# Patient Record
Sex: FEMALE | Race: WHITE | NOT HISPANIC OR LATINO | Employment: UNEMPLOYED | URBAN - METROPOLITAN AREA
[De-identification: names, ages, dates, MRNs, and addresses within clinical notes are randomized per-mention and may not be internally consistent; named-entity substitution may affect disease eponyms.]

---

## 2020-08-02 ENCOUNTER — ANESTHESIA (OUTPATIENT)
Dept: LABOR AND DELIVERY | Facility: HOSPITAL | Age: 40
End: 2020-08-02

## 2020-08-02 ENCOUNTER — ANESTHESIA EVENT (OUTPATIENT)
Dept: LABOR AND DELIVERY | Facility: HOSPITAL | Age: 40
End: 2020-08-02

## 2020-08-02 ENCOUNTER — HOSPITAL ENCOUNTER (INPATIENT)
Facility: HOSPITAL | Age: 40
LOS: 5 days | Discharge: HOME | End: 2020-08-07
Attending: OBSTETRICS & GYNECOLOGY | Admitting: OBSTETRICS & GYNECOLOGY

## 2020-08-02 DIAGNOSIS — Z3A.36 36 WEEKS GESTATION OF PREGNANCY: Primary | ICD-10-CM

## 2020-08-02 LAB
ABO GROUP BLD: NORMAL
ALP SERPL-CCNC: 127 U/L (ref 39–117)
ALT SERPL W P-5'-P-CCNC: 14 U/L (ref 1–33)
AMPHET+METHAMPHET UR QL: NEGATIVE
AMPHETAMINES UR QL: NEGATIVE
AST SERPL-CCNC: 26 U/L (ref 1–32)
BARBITURATES UR QL SCN: NEGATIVE
BENZODIAZ UR QL SCN: NEGATIVE
BILIRUB SERPL-MCNC: 0.2 MG/DL (ref 0–1.2)
BLD GP AB SCN SERPL QL: NEGATIVE
BUPRENORPHINE SERPL-MCNC: NEGATIVE NG/ML
CANNABINOIDS SERPL QL: POSITIVE
COCAINE UR QL: NEGATIVE
CREAT SERPL-MCNC: 0.66 MG/DL (ref 0.57–1)
DEPRECATED RDW RBC AUTO: 44.8 FL (ref 37–54)
ERYTHROCYTE [DISTWIDTH] IN BLOOD BY AUTOMATED COUNT: 13.6 % (ref 12.3–15.4)
HCT VFR BLD AUTO: 34.1 % (ref 34–46.6)
HGB BLD-MCNC: 11.3 G/DL (ref 12–15.9)
LDH SERPL-CCNC: 199 U/L (ref 135–214)
MCH RBC QN AUTO: 29.8 PG (ref 26.6–33)
MCHC RBC AUTO-ENTMCNC: 33.1 G/DL (ref 31.5–35.7)
MCV RBC AUTO: 90 FL (ref 79–97)
METHADONE UR QL SCN: NEGATIVE
OPIATES UR QL: NEGATIVE
OXYCODONE UR QL SCN: POSITIVE
PCP UR QL SCN: NEGATIVE
PLATELET # BLD AUTO: 244 10*3/MM3 (ref 140–450)
PMV BLD AUTO: 10.7 FL (ref 6–12)
PROPOXYPH UR QL: NEGATIVE
RBC # BLD AUTO: 3.79 10*6/MM3 (ref 3.77–5.28)
RH BLD: POSITIVE
T&S EXPIRATION DATE: NORMAL
TRICYCLICS UR QL SCN: NEGATIVE
URATE SERPL-MCNC: 3.3 MG/DL (ref 2.4–5.7)
WBC # BLD AUTO: 9.28 10*3/MM3 (ref 3.4–10.8)

## 2020-08-02 PROCEDURE — 25010000002 ONDANSETRON PER 1 MG: Performed by: ANESTHESIOLOGY

## 2020-08-02 PROCEDURE — 86901 BLOOD TYPING SEROLOGIC RH(D): CPT | Performed by: OBSTETRICS & GYNECOLOGY

## 2020-08-02 PROCEDURE — 25010000002 FENTANYL CITRATE (PF) 100 MCG/2ML SOLUTION: Performed by: ANESTHESIOLOGY

## 2020-08-02 PROCEDURE — 25010000002 KETOROLAC TROMETHAMINE PER 15 MG: Performed by: OBSTETRICS & GYNECOLOGY

## 2020-08-02 PROCEDURE — 82565 ASSAY OF CREATININE: CPT | Performed by: OBSTETRICS & GYNECOLOGY

## 2020-08-02 PROCEDURE — 84550 ASSAY OF BLOOD/URIC ACID: CPT | Performed by: OBSTETRICS & GYNECOLOGY

## 2020-08-02 PROCEDURE — 80306 DRUG TEST PRSMV INSTRMNT: CPT | Performed by: OBSTETRICS & GYNECOLOGY

## 2020-08-02 PROCEDURE — G0480 DRUG TEST DEF 1-7 CLASSES: HCPCS | Performed by: OBSTETRICS & GYNECOLOGY

## 2020-08-02 PROCEDURE — 84075 ASSAY ALKALINE PHOSPHATASE: CPT | Performed by: OBSTETRICS & GYNECOLOGY

## 2020-08-02 PROCEDURE — 84460 ALANINE AMINO (ALT) (SGPT): CPT | Performed by: OBSTETRICS & GYNECOLOGY

## 2020-08-02 PROCEDURE — 25010000002 METOCLOPRAMIDE PER 10 MG: Performed by: ANESTHESIOLOGY

## 2020-08-02 PROCEDURE — 0UT70ZZ RESECTION OF BILATERAL FALLOPIAN TUBES, OPEN APPROACH: ICD-10-PCS | Performed by: OBSTETRICS & GYNECOLOGY

## 2020-08-02 PROCEDURE — 85027 COMPLETE CBC AUTOMATED: CPT | Performed by: OBSTETRICS & GYNECOLOGY

## 2020-08-02 PROCEDURE — 25010000003 MORPHINE PER 10 MG: Performed by: ANESTHESIOLOGY

## 2020-08-02 PROCEDURE — 86850 RBC ANTIBODY SCREEN: CPT | Performed by: OBSTETRICS & GYNECOLOGY

## 2020-08-02 PROCEDURE — 84450 TRANSFERASE (AST) (SGOT): CPT | Performed by: OBSTETRICS & GYNECOLOGY

## 2020-08-02 PROCEDURE — 88302 TISSUE EXAM BY PATHOLOGIST: CPT | Performed by: OBSTETRICS & GYNECOLOGY

## 2020-08-02 PROCEDURE — 25010000002 MIDAZOLAM PER 1 MG: Performed by: ANESTHESIOLOGY

## 2020-08-02 PROCEDURE — 86900 BLOOD TYPING SEROLOGIC ABO: CPT | Performed by: OBSTETRICS & GYNECOLOGY

## 2020-08-02 PROCEDURE — 82247 BILIRUBIN TOTAL: CPT | Performed by: OBSTETRICS & GYNECOLOGY

## 2020-08-02 PROCEDURE — 83615 LACTATE (LD) (LDH) ENZYME: CPT | Performed by: OBSTETRICS & GYNECOLOGY

## 2020-08-02 PROCEDURE — 59025 FETAL NON-STRESS TEST: CPT

## 2020-08-02 RX ORDER — KETOROLAC TROMETHAMINE 30 MG/ML
30 INJECTION, SOLUTION INTRAMUSCULAR; INTRAVENOUS EVERY 6 HOURS PRN
Status: DISPENSED | OUTPATIENT
Start: 2020-08-02 | End: 2020-08-07

## 2020-08-02 RX ORDER — TRISODIUM CITRATE DIHYDRATE AND CITRIC ACID MONOHYDRATE 500; 334 MG/5ML; MG/5ML
30 SOLUTION ORAL ONCE
Status: COMPLETED | OUTPATIENT
Start: 2020-08-02 | End: 2020-08-02

## 2020-08-02 RX ORDER — OXYTOCIN-SODIUM CHLORIDE 0.9% IV SOLN 30 UNIT/500ML 30-0.9/5 UT/ML-%
85 SOLUTION INTRAVENOUS ONCE
Status: DISCONTINUED | OUTPATIENT
Start: 2020-08-02 | End: 2020-08-02 | Stop reason: HOSPADM

## 2020-08-02 RX ORDER — OXYTOCIN-SODIUM CHLORIDE 0.9% IV SOLN 30 UNIT/500ML 30-0.9/5 UT/ML-%
SOLUTION INTRAVENOUS AS NEEDED
Status: DISCONTINUED | OUTPATIENT
Start: 2020-08-02 | End: 2020-08-02 | Stop reason: SURG

## 2020-08-02 RX ORDER — CARBOPROST TROMETHAMINE 250 UG/ML
250 INJECTION, SOLUTION INTRAMUSCULAR AS NEEDED
Status: DISCONTINUED | OUTPATIENT
Start: 2020-08-02 | End: 2020-08-02 | Stop reason: HOSPADM

## 2020-08-02 RX ORDER — NALOXONE HCL 0.4 MG/ML
0.4 VIAL (ML) INJECTION
Status: ACTIVE | OUTPATIENT
Start: 2020-08-02 | End: 2020-08-03

## 2020-08-02 RX ORDER — ONDANSETRON 2 MG/ML
INJECTION INTRAMUSCULAR; INTRAVENOUS AS NEEDED
Status: DISCONTINUED | OUTPATIENT
Start: 2020-08-02 | End: 2020-08-02 | Stop reason: SURG

## 2020-08-02 RX ORDER — ONDANSETRON 2 MG/ML
4 INJECTION INTRAMUSCULAR; INTRAVENOUS ONCE
Status: COMPLETED | OUTPATIENT
Start: 2020-08-02 | End: 2020-08-02

## 2020-08-02 RX ORDER — MIDAZOLAM HYDROCHLORIDE 1 MG/ML
INJECTION INTRAMUSCULAR; INTRAVENOUS AS NEEDED
Status: DISCONTINUED | OUTPATIENT
Start: 2020-08-02 | End: 2020-08-02 | Stop reason: SURG

## 2020-08-02 RX ORDER — SERTRALINE HYDROCHLORIDE 100 MG/1
100 TABLET, FILM COATED ORAL DAILY
COMMUNITY

## 2020-08-02 RX ORDER — FENTANYL CITRATE 50 UG/ML
INJECTION, SOLUTION INTRAMUSCULAR; INTRAVENOUS
Status: COMPLETED | OUTPATIENT
Start: 2020-08-02 | End: 2020-08-02

## 2020-08-02 RX ORDER — PROMETHAZINE HYDROCHLORIDE 25 MG/1
25 TABLET ORAL EVERY 6 HOURS PRN
COMMUNITY
End: 2020-08-07 | Stop reason: HOSPADM

## 2020-08-02 RX ORDER — OXYCODONE HYDROCHLORIDE AND ACETAMINOPHEN 5; 325 MG/1; MG/1
1 TABLET ORAL EVERY 4 HOURS PRN
Status: DISCONTINUED | OUTPATIENT
Start: 2020-08-02 | End: 2020-08-02

## 2020-08-02 RX ORDER — FERROUS SULFATE TAB EC 324 MG (65 MG FE EQUIVALENT) 324 (65 FE) MG
324 TABLET DELAYED RESPONSE ORAL
COMMUNITY
End: 2020-08-07 | Stop reason: HOSPADM

## 2020-08-02 RX ORDER — PRENATAL WITH FERROUS FUM AND FOLIC ACID 3080; 920; 120; 400; 22; 1.84; 3; 20; 10; 1; 12; 200; 27; 25; 2 [IU]/1; [IU]/1; MG/1; [IU]/1; MG/1; MG/1; MG/1; MG/1; MG/1; MG/1; UG/1; MG/1; MG/1; MG/1; MG/1
TABLET ORAL DAILY
COMMUNITY
End: 2021-04-07

## 2020-08-02 RX ORDER — OXYCODONE HYDROCHLORIDE AND ACETAMINOPHEN 5; 325 MG/1; MG/1
2 TABLET ORAL EVERY 4 HOURS PRN
Status: DISCONTINUED | OUTPATIENT
Start: 2020-08-02 | End: 2020-08-07 | Stop reason: HOSPADM

## 2020-08-02 RX ORDER — FAMOTIDINE 10 MG/ML
INJECTION, SOLUTION INTRAVENOUS AS NEEDED
Status: DISCONTINUED | OUTPATIENT
Start: 2020-08-02 | End: 2020-08-02 | Stop reason: SURG

## 2020-08-02 RX ORDER — LIDOCAINE HYDROCHLORIDE 10 MG/ML
5 INJECTION, SOLUTION EPIDURAL; INFILTRATION; INTRACAUDAL; PERINEURAL AS NEEDED
Status: DISCONTINUED | OUTPATIENT
Start: 2020-08-02 | End: 2020-08-02 | Stop reason: HOSPADM

## 2020-08-02 RX ORDER — SODIUM CHLORIDE 0.9 % (FLUSH) 0.9 %
1-10 SYRINGE (ML) INJECTION AS NEEDED
Status: DISCONTINUED | OUTPATIENT
Start: 2020-08-02 | End: 2020-08-02 | Stop reason: HOSPADM

## 2020-08-02 RX ORDER — DIPHENHYDRAMINE HCL 25 MG
25 CAPSULE ORAL EVERY 4 HOURS PRN
Status: DISCONTINUED | OUTPATIENT
Start: 2020-08-02 | End: 2020-08-07 | Stop reason: HOSPADM

## 2020-08-02 RX ORDER — METOCLOPRAMIDE HYDROCHLORIDE 5 MG/ML
INJECTION INTRAMUSCULAR; INTRAVENOUS AS NEEDED
Status: DISCONTINUED | OUTPATIENT
Start: 2020-08-02 | End: 2020-08-02 | Stop reason: SURG

## 2020-08-02 RX ORDER — BUPIVACAINE HYDROCHLORIDE 7.5 MG/ML
INJECTION, SOLUTION INTRASPINAL
Status: COMPLETED | OUTPATIENT
Start: 2020-08-02 | End: 2020-08-02

## 2020-08-02 RX ORDER — IBUPROFEN 600 MG/1
600 TABLET ORAL EVERY 6 HOURS PRN
Status: DISCONTINUED | OUTPATIENT
Start: 2020-08-02 | End: 2020-08-07 | Stop reason: HOSPADM

## 2020-08-02 RX ORDER — METHYLERGONOVINE MALEATE 0.2 MG/ML
200 INJECTION INTRAVENOUS ONCE AS NEEDED
Status: DISCONTINUED | OUTPATIENT
Start: 2020-08-02 | End: 2020-08-02 | Stop reason: HOSPADM

## 2020-08-02 RX ORDER — OXYTOCIN-SODIUM CHLORIDE 0.9% IV SOLN 30 UNIT/500ML 30-0.9/5 UT/ML-%
650 SOLUTION INTRAVENOUS ONCE
Status: DISCONTINUED | OUTPATIENT
Start: 2020-08-02 | End: 2020-08-02 | Stop reason: HOSPADM

## 2020-08-02 RX ORDER — LANOLIN
CREAM (ML) TOPICAL
Status: DISCONTINUED | OUTPATIENT
Start: 2020-08-02 | End: 2020-08-07 | Stop reason: HOSPADM

## 2020-08-02 RX ORDER — MISOPROSTOL 200 UG/1
800 TABLET ORAL AS NEEDED
Status: DISCONTINUED | OUTPATIENT
Start: 2020-08-02 | End: 2020-08-02 | Stop reason: HOSPADM

## 2020-08-02 RX ORDER — OXYTOCIN 10 [USP'U]/ML
INJECTION, SOLUTION INTRAMUSCULAR; INTRAVENOUS AS NEEDED
Status: DISCONTINUED | OUTPATIENT
Start: 2020-08-02 | End: 2020-08-02 | Stop reason: SURG

## 2020-08-02 RX ORDER — SODIUM CHLORIDE 0.9 % (FLUSH) 0.9 %
3 SYRINGE (ML) INJECTION EVERY 12 HOURS SCHEDULED
Status: DISCONTINUED | OUTPATIENT
Start: 2020-08-02 | End: 2020-08-02 | Stop reason: HOSPADM

## 2020-08-02 RX ORDER — MORPHINE SULFATE 0.5 MG/ML
INJECTION, SOLUTION EPIDURAL; INTRATHECAL; INTRAVENOUS
Status: COMPLETED | OUTPATIENT
Start: 2020-08-02 | End: 2020-08-02

## 2020-08-02 RX ORDER — SODIUM CHLORIDE, SODIUM LACTATE, POTASSIUM CHLORIDE, CALCIUM CHLORIDE 600; 310; 30; 20 MG/100ML; MG/100ML; MG/100ML; MG/100ML
125 INJECTION, SOLUTION INTRAVENOUS CONTINUOUS
Status: DISCONTINUED | OUTPATIENT
Start: 2020-08-02 | End: 2020-08-07 | Stop reason: HOSPADM

## 2020-08-02 RX ORDER — ONDANSETRON 4 MG/1
4 TABLET, FILM COATED ORAL EVERY 8 HOURS PRN
Status: DISCONTINUED | OUTPATIENT
Start: 2020-08-02 | End: 2020-08-07 | Stop reason: HOSPADM

## 2020-08-02 RX ORDER — OXYCODONE HYDROCHLORIDE AND ACETAMINOPHEN 5; 325 MG/1; MG/1
1 TABLET ORAL ONCE AS NEEDED
Status: COMPLETED | OUTPATIENT
Start: 2020-08-02 | End: 2020-08-02

## 2020-08-02 RX ORDER — CLINDAMYCIN PHOSPHATE 900 MG/50ML
900 INJECTION INTRAVENOUS ONCE
Status: COMPLETED | OUTPATIENT
Start: 2020-08-02 | End: 2020-08-02

## 2020-08-02 RX ORDER — HYDROXYZINE 50 MG/1
50 TABLET, FILM COATED ORAL 2 TIMES DAILY
COMMUNITY
End: 2020-08-07 | Stop reason: HOSPADM

## 2020-08-02 RX ORDER — DIPHENHYDRAMINE HYDROCHLORIDE 50 MG/ML
25 INJECTION INTRAMUSCULAR; INTRAVENOUS EVERY 4 HOURS PRN
Status: DISCONTINUED | OUTPATIENT
Start: 2020-08-02 | End: 2020-08-07 | Stop reason: HOSPADM

## 2020-08-02 RX ORDER — OXYCODONE HYDROCHLORIDE AND ACETAMINOPHEN 5; 325 MG/1; MG/1
1 TABLET ORAL ONCE
Status: COMPLETED | OUTPATIENT
Start: 2020-08-02 | End: 2020-08-02

## 2020-08-02 RX ORDER — SIMETHICONE 80 MG
80 TABLET,CHEWABLE ORAL 4 TIMES DAILY PRN
Status: DISCONTINUED | OUTPATIENT
Start: 2020-08-02 | End: 2020-08-07 | Stop reason: HOSPADM

## 2020-08-02 RX ADMIN — SODIUM CHLORIDE, POTASSIUM CHLORIDE, SODIUM LACTATE AND CALCIUM CHLORIDE 125 ML/HR: 600; 310; 30; 20 INJECTION, SOLUTION INTRAVENOUS at 10:13

## 2020-08-02 RX ADMIN — MORPHINE SULFATE 0.15 MG: 0.5 INJECTION, SOLUTION EPIDURAL; INTRATHECAL; INTRAVENOUS at 10:34

## 2020-08-02 RX ADMIN — OXYTOCIN 10 UNITS: 10 INJECTION, SOLUTION INTRAMUSCULAR; INTRAVENOUS at 10:51

## 2020-08-02 RX ADMIN — OXYCODONE HYDROCHLORIDE AND ACETAMINOPHEN 2 TABLET: 5; 325 TABLET ORAL at 22:20

## 2020-08-02 RX ADMIN — BUPIVACAINE HYDROCHLORIDE IN DEXTROSE 1.4 ML: 7.5 INJECTION, SOLUTION SUBARACHNOID at 10:34

## 2020-08-02 RX ADMIN — CLINDAMYCIN IN 5 PERCENT DEXTROSE 900 MG: 18 INJECTION, SOLUTION INTRAVENOUS at 10:12

## 2020-08-02 RX ADMIN — SODIUM CHLORIDE, POTASSIUM CHLORIDE, SODIUM LACTATE AND CALCIUM CHLORIDE 125 ML/HR: 600; 310; 30; 20 INJECTION, SOLUTION INTRAVENOUS at 18:17

## 2020-08-02 RX ADMIN — SODIUM CHLORIDE, POTASSIUM CHLORIDE, SODIUM LACTATE AND CALCIUM CHLORIDE: 600; 310; 30; 20 INJECTION, SOLUTION INTRAVENOUS at 10:35

## 2020-08-02 RX ADMIN — KETOROLAC TROMETHAMINE 30 MG: 30 INJECTION, SOLUTION INTRAMUSCULAR at 19:24

## 2020-08-02 RX ADMIN — OXYCODONE HYDROCHLORIDE AND ACETAMINOPHEN 1 TABLET: 5; 325 TABLET ORAL at 13:09

## 2020-08-02 RX ADMIN — CLINDAMYCIN IN 5 PERCENT DEXTROSE 900 MG: 18 INJECTION, SOLUTION INTRAVENOUS at 10:27

## 2020-08-02 RX ADMIN — OXYTOCIN 500 ML: 10 INJECTION INTRAVENOUS at 11:08

## 2020-08-02 RX ADMIN — ONDANSETRON 4 MG: 2 INJECTION INTRAMUSCULAR; INTRAVENOUS at 10:37

## 2020-08-02 RX ADMIN — IBUPROFEN 600 MG: 600 TABLET, FILM COATED ORAL at 13:09

## 2020-08-02 RX ADMIN — SODIUM CITRATE AND CITRIC ACID MONOHYDRATE 30 ML: 500; 334 SOLUTION ORAL at 10:12

## 2020-08-02 RX ADMIN — OXYCODONE HYDROCHLORIDE AND ACETAMINOPHEN 1 TABLET: 5; 325 TABLET ORAL at 12:27

## 2020-08-02 RX ADMIN — ONDANSETRON 4 MG: 2 INJECTION INTRAMUSCULAR; INTRAVENOUS at 12:48

## 2020-08-02 RX ADMIN — SIMETHICONE CHEW TAB 80 MG 80 MG: 80 TABLET ORAL at 22:42

## 2020-08-02 RX ADMIN — FAMOTIDINE 20 MG: 10 INJECTION, SOLUTION INTRAVENOUS at 10:37

## 2020-08-02 RX ADMIN — FENTANYL CITRATE 15 MCG: 50 INJECTION, SOLUTION INTRAMUSCULAR; INTRAVENOUS at 10:34

## 2020-08-02 RX ADMIN — METOCLOPRAMIDE 10 MG: 5 INJECTION, SOLUTION INTRAMUSCULAR; INTRAVENOUS at 10:37

## 2020-08-02 RX ADMIN — OXYCODONE HYDROCHLORIDE AND ACETAMINOPHEN 1 TABLET: 5; 325 TABLET ORAL at 14:41

## 2020-08-02 RX ADMIN — MIDAZOLAM 2 MG: 1 INJECTION INTRAMUSCULAR; INTRAVENOUS at 10:44

## 2020-08-02 RX ADMIN — OXYCODONE HYDROCHLORIDE AND ACETAMINOPHEN 2 TABLET: 5; 325 TABLET ORAL at 18:18

## 2020-08-02 NOTE — ANESTHESIA PROCEDURE NOTES
Spinal Block      Patient location during procedure: OR  Start Time: 8/2/2020 10:34 AM  Stop Time: 8/2/2020 10:34 AM  Indication:at surgeon's request  Performed By  Anesthesiologist: Ronny Srinivasan MD  Spinal Block Prep:  Patient Position:sitting  Sterile Tech:cap, gloves, mask and sterile barriers  Prep:Chloraprep  Patient Monitoring:blood pressure monitoring and EKG  Spinal Block Procedure  Approach:midline  Guidance:palpation technique  Location:L2-L3  Needle Type:Kurt  Needle Gauge:25 G  Placement of Spinal needle event:cerebrospinal fluid aspirated  Paresthesia: no  Fluid Appearance:clear  Medications: bupivacaine in dextrose (MARCAINE SPINAL / Hyberbaric) 0.75-8.25 % injection, 1.4 mL  morphine PF (ASTRAMORPH) injection, 0.15 mg  fentaNYL citrate (PF) (SUBLIMAZE) injection, 15 mcg  Med Administered at 8/2/2020 10:34 AM   Post Assessment  Patient Tolerance:patient tolerated the procedure well with no apparent complications  Complications no

## 2020-08-02 NOTE — OP NOTE
Operative Note    Patient name: Comfort Fernandez  YOB: 1980   MRN: [id number]  Admission Date: 2020  Referring Provider: Salas Lucero MD    ID: 39 y.o.  at 36w2d    Preoperative Diagnosis: Previous , patient admitted in active labor, desires permanent tubal sterilization    Postoperative Diagnosis: Same as above.    Procedure(s): repeatlow transverse  delivery                             Bilateral segmental salpingectomy       Surgeons: Surgeon(s) and Role:     RAMILA Santos MD - Primary    Anesthesia: Spinal    Estimated Blood Loss: 500 mL mL    IV Fluids: [unfilled]    Preoperative antibiotic: Clindamycin    Blood products:   Blood Administration Record (From admission, onward)    None          Pathology:   Order Name Source Comment Collection Info Order Time   TISSUE PATHOLOGY EXAM Fallopian Tubes, Bilateral  Collected By: Mercy Maki RN 2020 11:02 AM     Specimen source(s):   Fallopian Tubes, Bilateral            Drains: Zheng catheter to gravity    Complications: None    Condition: Stable to recovery room                                          Infant:                 Gender: male  infant    Weight: 2682 g (5 lb 14.6 oz)     Apgars: 8   @ 1 minute /     9   @ 5 minutes    Cord gases: Venous:  No components found for:  PHCVEN,  BECVEN      Arterial:  No components found for:  PHCART,  BECART          Operative Summary:   After obtaining informed consent the patient was taken to the operating room where adequate anesthesia was obtained.  Zheng catheter was placed in the bladder preoperatively.  IV antibiotics were given preoperatively.       The abdomen was prepped and draped in the usual sterile fashion for  delivery.  After confirming adequate anesthesia a Pfannenstiel skin incision was made with the scalpel and carried through to the underlying layer of fascia.  The fascia was incised in the midline and the incision extended laterally with the Rizzo  scissors and with blunt dissection.       The upper aspect of the fascia was grasped with 2 Kocher clamps, elevated, and dissected off the underlying rectus muscles bluntly and with the Rizzo scissors.  The Kocher clamps were removed and applied to the inferior aspect of the fascia.  The fascia was dissected off of the rectus muscles in the same fashion.  The peritoneum was entered bluntly.  The incision was stretched and the bladder blade and Gimenez retractor inserted for visualization of the uterus.      The uterus was incised with the scalpel in a low transverse fashion.  The uterine incision was entered digitally and the incision extended bluntly in a cranial-caudal fashion.  Retractors were removed and membranes were ruptured.  The infant was delivered atraumatically from cephalic presentation.  The umbilical cord was clamped and cut and the nose and mouth bulb suctioned.  The infant was handed off to waiting pediatric staff.      Cord blood gases were not collected.  Cord blood was collected.  The placenta was removed using cord traction and uterine massage.  The uterus was exteriorized and cleared of all clots and debris.  The uterine incision was repaired with 1-0 Chromic in a running locked fashion. A single-layer technique was used.  Additional hemostatic measures required: electrocautery.  Tubal sterilization was performed.  Each tube was identified followed to the fimbriated end.  The tubes were doubled; tied with 0 plain suture x2.  The knuckles above the ties were cut and sent to the lab for confirming diagnosis.  Good hemostasis noted.  There were a couple of small subserosal fibroids noted on the anterior part of the uterus.  The largest was 2 cm.  We left these alone    The incision was inspected and excellent hemostasis was noted.  The tubes and ovaries were noted to be normal. The uterus was returned to the abdomen.  The gutters were cleared of all clots and debris.  Irrigation was used.  The  uterine incision was again inspected and found to be hemostatic.      The peritoneum was reapproximated with 2.0 Vicryl .  The fascia was closed with 0 Vicryl  in a running fashion (two segments).  The subcutaneous space was reapproximated using 3.0 Plain.      The skin was closed using insorb stapler.  The patient was transferred to the recovery room in stable condition.    Ryan Santos MD  8/2/2020  11:32

## 2020-08-02 NOTE — ANESTHESIA PREPROCEDURE EVALUATION
Anesthesia Evaluation     Patient summary reviewed and Nursing notes reviewed                Airway   Mallampati: I  TM distance: >3 FB  Neck ROM: full  No difficulty expected  Dental - normal exam     Pulmonary - negative pulmonary ROS and normal exam   Cardiovascular - negative cardio ROS and normal exam        Neuro/Psych  (+) neuromuscular disease,     GI/Hepatic/Renal/Endo - negative ROS     Musculoskeletal (-) negative ROS    Abdominal  - normal exam    Bowel sounds: normal.   Substance History - negative use     OB/GYN    (+) Pregnant,         Other   blood dyscrasia,   history of cancer remission                    Anesthesia Plan    ASA 2 - emergent     spinal       Anesthetic plan, all risks, benefits, and alternatives have been provided, discussed and informed consent has been obtained with: patient.  Use of blood products discussed with patient .   Plan discussed with attending.

## 2020-08-02 NOTE — ANESTHESIA POSTPROCEDURE EVALUATION
Patient: Comfort Fernandez    Procedure Summary     Date:  20 Room / Location:  [facility] LABOR DELIVERY   [facility]    Anesthesia Start:  1027 Anesthesia Stop:  113    Procedure:   SECTION REPEAT (N/A Abdomen) Diagnosis:      Surgeon:  Salas Lucero MD Provider:  Ronny Srinivasan MD    Anesthesia Type:  spinal ASA Status:  2 - Emergent          Anesthesia Type: spinal    Vitals  Vitals Value Taken Time   /62 2020 11:31 AM   Temp     Pulse 67 2020 11:34 AM   Resp     SpO2 97 % 2020 11:34 AM   Vitals shown include unvalidated device data.        Post Anesthesia Care and Evaluation    Patient location during evaluation: bedside  Patient participation: complete - patient participated  Level of consciousness: awake and alert  Pain score: 0  Pain management: adequate  Airway patency: patent  Anesthetic complications: No anesthetic complications    Cardiovascular status: acceptable  Respiratory status: acceptable  Hydration status: acceptable

## 2020-08-02 NOTE — H&P
39-year-old A1 at 36 weeks and 2 days.  Patient with a previous  section.  1 previous vaginal delivery  with a shoulder dystocia.  She was scheduled for repeat .  She is in active labor 6 cm; we will proceed with  today.  Past medical history: Acute lymph void leukemia, complications with chemotherapy, anxiety  Surgeries:  section  Allergies: Keflex, Neurontin , vancomycin  Chart and prenatal record reviewed    Afeb VSS  Ht- RR  Lungs- Clear  Abd- soft nontender  Uterus- appropriate for gestational age     A/P repeat  at 36 weeks and 2 days patient in early labor 6 cm, patient desires sterilization  Plan- repeat  and tubal sterilization

## 2020-08-03 LAB
BASOPHILS # BLD AUTO: 0.03 10*3/MM3 (ref 0–0.2)
BASOPHILS NFR BLD AUTO: 0.3 % (ref 0–1.5)
DEPRECATED RDW RBC AUTO: 45 FL (ref 37–54)
EOSINOPHIL # BLD AUTO: 0.27 10*3/MM3 (ref 0–0.4)
EOSINOPHIL NFR BLD AUTO: 2.4 % (ref 0.3–6.2)
ERYTHROCYTE [DISTWIDTH] IN BLOOD BY AUTOMATED COUNT: 13.6 % (ref 12.3–15.4)
HCT VFR BLD AUTO: 31.8 % (ref 34–46.6)
HGB BLD-MCNC: 10.3 G/DL (ref 12–15.9)
IMM GRANULOCYTES # BLD AUTO: 0.05 10*3/MM3 (ref 0–0.05)
IMM GRANULOCYTES NFR BLD AUTO: 0.5 % (ref 0–0.5)
LYMPHOCYTES # BLD AUTO: 1.87 10*3/MM3 (ref 0.7–3.1)
LYMPHOCYTES NFR BLD AUTO: 16.9 % (ref 19.6–45.3)
MCH RBC QN AUTO: 29.5 PG (ref 26.6–33)
MCHC RBC AUTO-ENTMCNC: 32.4 G/DL (ref 31.5–35.7)
MCV RBC AUTO: 91.1 FL (ref 79–97)
MONOCYTES # BLD AUTO: 0.88 10*3/MM3 (ref 0.1–0.9)
MONOCYTES NFR BLD AUTO: 8 % (ref 5–12)
NEUTROPHILS NFR BLD AUTO: 7.95 10*3/MM3 (ref 1.7–7)
NEUTROPHILS NFR BLD AUTO: 71.9 % (ref 42.7–76)
NRBC BLD AUTO-RTO: 0 /100 WBC (ref 0–0.2)
PLATELET # BLD AUTO: 207 10*3/MM3 (ref 140–450)
PMV BLD AUTO: 11.1 FL (ref 6–12)
RBC # BLD AUTO: 3.49 10*6/MM3 (ref 3.77–5.28)
WBC # BLD AUTO: 11.05 10*3/MM3 (ref 3.4–10.8)

## 2020-08-03 PROCEDURE — 25010000002 KETOROLAC TROMETHAMINE PER 15 MG: Performed by: OBSTETRICS & GYNECOLOGY

## 2020-08-03 PROCEDURE — 85025 COMPLETE CBC W/AUTO DIFF WBC: CPT | Performed by: OBSTETRICS & GYNECOLOGY

## 2020-08-03 PROCEDURE — 63710000001 DIPHENHYDRAMINE PER 50 MG: Performed by: ANESTHESIOLOGY

## 2020-08-03 PROCEDURE — 63710000001 ONDANSETRON PER 8 MG: Performed by: OBSTETRICS & GYNECOLOGY

## 2020-08-03 PROCEDURE — 25010000002 DIPHENHYDRAMINE PER 50 MG: Performed by: ANESTHESIOLOGY

## 2020-08-03 RX ORDER — SERTRALINE HYDROCHLORIDE 25 MG/1
100 TABLET, FILM COATED ORAL DAILY
Status: DISCONTINUED | OUTPATIENT
Start: 2020-08-03 | End: 2020-08-07 | Stop reason: HOSPADM

## 2020-08-03 RX ADMIN — DIPHENHYDRAMINE HYDROCHLORIDE 25 MG: 25 CAPSULE ORAL at 14:53

## 2020-08-03 RX ADMIN — KETOROLAC TROMETHAMINE 30 MG: 30 INJECTION, SOLUTION INTRAMUSCULAR at 08:39

## 2020-08-03 RX ADMIN — KETOROLAC TROMETHAMINE 30 MG: 30 INJECTION, SOLUTION INTRAMUSCULAR at 21:12

## 2020-08-03 RX ADMIN — OXYCODONE HYDROCHLORIDE AND ACETAMINOPHEN 2 TABLET: 5; 325 TABLET ORAL at 05:50

## 2020-08-03 RX ADMIN — DIPHENHYDRAMINE HYDROCHLORIDE 25 MG: 25 CAPSULE ORAL at 02:07

## 2020-08-03 RX ADMIN — SIMETHICONE CHEW TAB 80 MG 80 MG: 80 TABLET ORAL at 17:20

## 2020-08-03 RX ADMIN — KETOROLAC TROMETHAMINE 30 MG: 30 INJECTION, SOLUTION INTRAMUSCULAR at 02:07

## 2020-08-03 RX ADMIN — DIPHENHYDRAMINE HYDROCHLORIDE 25 MG: 50 INJECTION INTRAMUSCULAR; INTRAVENOUS at 08:44

## 2020-08-03 RX ADMIN — OXYCODONE HYDROCHLORIDE AND ACETAMINOPHEN 2 TABLET: 5; 325 TABLET ORAL at 14:53

## 2020-08-03 RX ADMIN — OXYCODONE HYDROCHLORIDE AND ACETAMINOPHEN 2 TABLET: 5; 325 TABLET ORAL at 02:07

## 2020-08-03 RX ADMIN — OXYCODONE HYDROCHLORIDE AND ACETAMINOPHEN 2 TABLET: 5; 325 TABLET ORAL at 10:49

## 2020-08-03 RX ADMIN — ONDANSETRON HYDROCHLORIDE 4 MG: 4 TABLET, FILM COATED ORAL at 17:20

## 2020-08-03 RX ADMIN — KETOROLAC TROMETHAMINE 30 MG: 30 INJECTION, SOLUTION INTRAMUSCULAR at 14:53

## 2020-08-03 RX ADMIN — SIMETHICONE CHEW TAB 80 MG 80 MG: 80 TABLET ORAL at 10:50

## 2020-08-03 RX ADMIN — SERTRALINE HYDROCHLORIDE 100 MG: 25 TABLET ORAL at 10:49

## 2020-08-03 RX ADMIN — OXYCODONE HYDROCHLORIDE AND ACETAMINOPHEN 2 TABLET: 5; 325 TABLET ORAL at 20:14

## 2020-08-03 NOTE — PAYOR COMM NOTE
Sourav Fernandez (39 y.o. Female)   [facility]  DELIVERY INFORMATION/INPATIENT CLINICALS  BABY'S NAME: KOBY    PHONE# [phone]  FAX# [phone]    Date of Birth Social Security Number Address Home Phone MRN    1980  [address]  [facility] [address] [phone] [id number]    Taoist Marital Status          None Single       Admission Date Admission Type Admitting Provider Attending Provider Department, Room/Bed    8/2/20 Elective Salas Lucero MD Parrott, Olson, MD [facility] MOTHER BABY 4B, N426/1    Discharge Date Discharge Disposition Discharge Destination                       Attending Provider:  Salas Lucero MD    Allergies:  Keflex [Cephalexin], Neurontin [Gabapentin], Vancomycin    Isolation:  None   Infection:  None   Code Status:  CPR    Ht:  --   Wt:  70.3 kg (155 lb)    Admission Cmt:  None   Principal Problem:  None                Active Insurance as of 8/2/2020     Primary Coverage     Payor Plan Insurance Group Employer/Plan Group    ANTH MEDICAID [facility] MEDICAID [id number]     Payor Plan Address Payor Plan Phone Number Payor Plan Fax Number Effective Dates    [address] [address] [phone]  5/1/2020 - None Entered    [facility] [address]       Subscriber Name Subscriber Birth Date Member ID       SOURAV FERNANDEZ 1980 [id number]                 Emergency Contacts      (Rel.) Home Phone Work Phone Mobile Phone    CONTACT, NO (Friend) -- -- [phone]            Insurance Information                ANTHEM MEDICAID/ANTHEM MEDICAID Phone: [phone]    Subscriber: Sourav Fernandez Subscriber#: [id number]    Group#: [id number] Precert#:           Treatment Team     Provider Relationship Specialty Contact    Salas Lucero MD Attending, Surgeon Obstetrics and Gynecology [phone]    Joycelyn Bond, RN Registered Nurse --     Liz Yeh RN Registered Nurse --     Effie Cruz, MSW   -- [phone]    Amira Marlow, PCT Patient Care Technician --     Pina Musa, RN Registered Nurse --                History & Physical      Ryan Santos MD at 20 1001        39-year-old A1 at 36 weeks and 2 days.  Patient with a previous  section.  1 previous vaginal delivery  with a shoulder dystocia.  She was scheduled for repeat .  She is in active labor 6 cm; we will proceed with  today.  Past medical history: Acute lymph void leukemia, complications with chemotherapy, anxiety  Surgeries:  section  Allergies: Keflex, Neurontin , vancomycin  Chart and prenatal record reviewed    Afeb VSS  Ht- RR  Lungs- Clear  Abd- soft nontender  Uterus- appropriate for gestational age     A/P repeat  at 36 weeks and 2 days patient in early labor 6 cm, patient desires sterilization  Plan- repeat  and tubal sterilization    Electronically signed by Ryan Santos MD at 20 1132     H&P signed by [employer], [place] at 20 1018      Scan on 2020 by [employer], [place]: UPDATED PRENATAL,LEXOBGYN  2020          Electronically signed by [employer] On[employer], [place] at 20 1018     H&P signed by [employer] On[employer], [place] at 20 1153      Scan on 2020 by [employer], [place]: prenatal          Electronically signed by [employer] On[employer], [place] at 20 1153       Vital Signs (last 2 days)     Date/Time   Temp   Temp src   Pulse   Resp   BP   SpO2    20 1100   98.7 (37.1)   Oral   71   16   133/73   --    20 0800   97 (36.1)   Oral   80   16   141/68   --    20 0400   98.3 (36.8)   Oral   54   16   100/61   --    20 2303   98.6 (37)   Oral   61   16   118/64   --    20 1945   98 (36.7)   --   61   16   119/78   --    20 1700   97.5 (36.4)   Oral   60   16   124/73   --    20 1545   -- Patient in NICU   --   --   --   --   --    Temp: Patient in NICU at 20 1545    20 1445   98.1 (36.7)    Axillary   58   20   118/62   --    08/02/20 1345   98.2 (36.8)   Oral   62   16   129/60   --    08/02/20 1315   97.9 (36.6)   Oral   56   20   141/86 Nurse notified.   --    BP: Nurse notified. at 08/02/20 1315    08/02/20 1230   --   --   --   --   127/79   100    08/02/20 1215   --   --   --   --   131/90   --    08/02/20 1214   --   --   55   --   --   100    08/02/20 1200   --   --   58   --   118/75   99    08/02/20 1156   --   --   59   --   119/74   100    08/02/20 1151   --   --   58   --   109/71   100    08/02/20 1146   --   --   58   --   104/72   99    08/02/20 1141   --   --   58   --   107/68   99    08/02/20 1136   --   --   64   --   107/69   98    08/02/20 1131   --   --   66   --   103/62   95    08/02/20 1129   97.7 (36.5)   Oral   66   16   105/67   --              Facility-Administered Medications as of 8/3/2020   Medication Dose Route Frequency Provider Last Rate Last Dose   • [COMPLETED] clindamycin (CLEOCIN) 900 mg in dextrose 5% 50 mL IVPB (premix)  900 mg Intravenous Once Shaun Cedeño DO 50 mL/hr at 08/02/20 1012 900 mg at 08/02/20 1027   • diphenhydrAMINE (BENADRYL) capsule 25 mg  25 mg Oral Q4H PRN Ronny Srinivasan MD   25 mg at 08/03/20 0207    Or   • diphenhydrAMINE (BENADRYL) injection 25 mg  25 mg Intravenous Q4H PRN Ronny Srinivasan MD   25 mg at 08/03/20 0844    Or   • diphenhydrAMINE (BENADRYL) injection 25 mg  25 mg Intramuscular Q4H PRN Ronny Srinivasan MD       • ibuprofen (ADVIL,MOTRIN) tablet 600 mg  600 mg Oral Q6H PRN Ryan Santos MD   600 mg at 08/02/20 1309   • ketorolac (TORADOL) injection 30 mg  30 mg Intravenous Q6H PRN Ryan Santos MD   30 mg at 08/03/20 0839   • lactated ringers infusion  125 mL/hr Intravenous Continuous Shaun Cedeño  mL/hr at 08/02/20 1817 125 mL/hr at 08/02/20 1817   • lanolin cream   Topical Q1H PRN Ryan Santos MD       • naloxone (NARCAN) injection 0.4 mg  0.4 mg Intravenous Code / Trauma / Sedation  Continuous Med Ronny Srinivasan MD       • [COMPLETED] ondansetron (ZOFRAN) injection 4 mg  4 mg Intravenous Once Ronny Srinivasan MD   4 mg at 08/02/20 1248   • ondansetron (ZOFRAN) tablet 4 mg  4 mg Oral Q8H PRN Ryan Santos MD       • [COMPLETED] oxyCODONE-acetaminophen (PERCOCET) 5-325 MG per tablet 1 tablet  1 tablet Oral Once PRN Ryan Santos MD   1 tablet at 08/02/20 1227   • [COMPLETED] oxyCODONE-acetaminophen (PERCOCET) 5-325 MG per tablet 1 tablet  1 tablet Oral Once Ryan Santos MD   1 tablet at 08/02/20 1441   • oxyCODONE-acetaminophen (PERCOCET) 5-325 MG per tablet 2 tablet  2 tablet Oral Q4H PRN Ryan Santos MD   2 tablet at 08/03/20 1049   • sertraline (ZOLOFT) tablet 100 mg  100 mg Oral Daily Joselyn Wadsworth APRN   100 mg at 08/03/20 1049   • simethicone (MYLICON) chewable tablet 80 mg  80 mg Oral 4x Daily PRN Salas Lucero MD   80 mg at 08/03/20 1050   • [COMPLETED] Sod Citrate-Citric Acid (BICITRA) solution 30 mL  30 mL Oral Once Shaun Cedeño DO   30 mL at 08/02/20 1012     Lab Results (last 48 hours)     Procedure Component Value Units Date/Time    Tissue Pathology Exam [[id number]] Collected:  08/02/20 1104    Specimen:  Tissue from Fallopian Tubes, Bilateral Updated:  08/03/20 0704    Urine Drug Screen - Urine, Clean Catch [[id number]]  (Abnormal) Collected:  08/02/20 1314    Specimen:  Urine, Clean Catch Updated:  08/02/20 1441     THC, Screen, Urine Positive     Phencyclidine (PCP), Urine Negative     Cocaine Screen, Urine Negative     Methamphetamine, Ur Negative     Opiate Screen Negative     Amphetamine Screen, Urine Negative     Benzodiazepine Screen, Urine Negative     Tricyclic Antidepressants Screen Negative     Methadone Screen, Urine Negative     Barbiturates Screen, Urine Negative     Oxycodone Screen, Urine Positive     Propoxyphene Screen Negative     Buprenorphine, Screen, Urine Negative    Narrative:       Cutoff For Drugs  Screened:    Amphetamines               500 ng/ml  Barbiturates               200 ng/ml  Benzodiazepines            150 ng/ml  Cocaine                    150 ng/ml  Methadone                  200 ng/ml  Opiates                    100 ng/ml  Phencyclidine               25 ng/ml  THC                            50 ng/ml  Methamphetamine            500 ng/ml  Tricyclic Antidepressants  300 ng/ml  Oxycodone                  100 ng/ml  Propoxyphene               300 ng/ml  Buprenorphine               10 ng/ml    The normal value for all drugs tested is negative. This report includes unconfirmed screening results, with the cutoff values listed, to be used for medical treatment purposes only.  Unconfirmed results must not be used for non-medical purposes such as employment or legal testing.  Clinical consideration should be applied to any drug of abuse test, particularly when unconfirmed results are used.      Cannabinoid Confirmation, Ur - Urine, Clean Catch [[id number]] Collected:  08/02/20 1314    Specimen:  Urine, Clean Catch Updated:  08/02/20 1340    Oxycodone / Morphone Confirmation, Urine - Urine, Clean Catch [[id number]] Collected:  08/02/20 1314    Specimen:  Urine, Clean Catch Updated:  08/02/20 1340    Preeclampsia Panel [[id number]]  (Abnormal) Collected:  08/02/20 0946    Specimen:  Blood Updated:  08/02/20 1031     Alkaline Phosphatase 127 U/L      ALT (SGPT) 14 U/L      AST (SGOT) 26 U/L      Creatinine 0.66 mg/dL      Total Bilirubin 0.2 mg/dL       U/L      Comment: Specimen hemolyzed.  Results may be affected.        Uric Acid 3.3 mg/dL     CBC (No Diff) [[id number]]  (Abnormal) Collected:  08/02/20 0946    Specimen:  Blood Updated:  08/02/20 0959     WBC 9.28 10*3/mm3      RBC 3.79 10*6/mm3      Hemoglobin 11.3 g/dL      Hematocrit 34.1 %      MCV 90.0 fL      MCH 29.8 pg      MCHC 33.1 g/dL      RDW 13.6 %      RDW-SD 44.8 fl      MPV 10.7 fL      Platelets 244 10*3/mm3           Imaging Results  (Last 48 Hours)     ** No results found for the last 48 hours. **           Operative/Procedure Notes (last 48 hours) (Notes from 20 1137 through 20 1137)      Ryan Santos MD at 20 1132          Operative Note    Patient name: Comfort Fernandez  YOB: 1980   MRN: [id number]  Admission Date: 2020  Referring Provider: Salas Lucero MD    ID: 39 y.o.  at 36w2d    Preoperative Diagnosis: Previous , patient admitted in active labor, desires permanent tubal sterilization    Postoperative Diagnosis: Same as above.    Procedure(s): repeatlow transverse  delivery                             Bilateral segmental salpingectomy       Surgeons: Surgeon(s) and Role:     RAMILA Santos MD - Primary    Anesthesia: Spinal    Estimated Blood Loss: 500 mL mL    IV Fluids: [unfilled]    Preoperative antibiotic: Clindamycin    Blood products:   Blood Administration Record (From admission, onward)    None          Pathology:   Order Name Source Comment Collection Info Order Time   TISSUE PATHOLOGY EXAM Fallopian Tubes, Bilateral  Collected By: Mercy Maki RN 2020 11:02 AM     Specimen source(s):   Fallopian Tubes, Bilateral            Drains: Zheng catheter to gravity    Complications: None    Condition: Stable to recovery room                                          Infant:                 Gender: male  infant    Weight: 2682 g (5 lb 14.6 oz)     Apgars: 8   @ 1 minute /     9   @ 5 minutes    Cord gases: Venous:  No components found for:  PHCVEN,  BECVEN      Arterial:  No components found for:  PHCART,  BECART          Operative Summary:   After obtaining informed consent the patient was taken to the operating room where adequate anesthesia was obtained.  Zheng catheter was placed in the bladder preoperatively.  IV antibiotics were given preoperatively.       The abdomen was prepped and draped in the usual sterile fashion for  delivery.  After confirming  adequate anesthesia a Pfannenstiel skin incision was made with the scalpel and carried through to the underlying layer of fascia.  The fascia was incised in the midline and the incision extended laterally with the Rizzo scissors and with blunt dissection.       The upper aspect of the fascia was grasped with 2 Kocher clamps, elevated, and dissected off the underlying rectus muscles bluntly and with the Rizzo scissors.  The Kocher clamps were removed and applied to the inferior aspect of the fascia.  The fascia was dissected off of the rectus muscles in the same fashion.  The peritoneum was entered bluntly.  The incision was stretched and the bladder blade and Gimenez retractor inserted for visualization of the uterus.      The uterus was incised with the scalpel in a low transverse fashion.  The uterine incision was entered digitally and the incision extended bluntly in a cranial-caudal fashion.  Retractors were removed and membranes were ruptured.  The infant was delivered atraumatically from cephalic presentation.  The umbilical cord was clamped and cut and the nose and mouth bulb suctioned.  The infant was handed off to waiting pediatric staff.      Cord blood gases were not collected.  Cord blood was collected.  The placenta was removed using cord traction and uterine massage.  The uterus was exteriorized and cleared of all clots and debris.  The uterine incision was repaired with 1-0 Chromic in a running locked fashion. A single-layer technique was used.  Additional hemostatic measures required: electrocautery.  Tubal sterilization was performed.  Each tube was identified followed to the fimbriated end.  The tubes were doubled; tied with 0 plain suture x2.  The knuckles above the ties were cut and sent to the lab for confirming diagnosis.  Good hemostasis noted.  There were a couple of small subserosal fibroids noted on the anterior part of the uterus.  The largest was 2 cm.  We left these alone    The  incision was inspected and excellent hemostasis was noted.  The tubes and ovaries were noted to be normal. The uterus was returned to the abdomen.  The gutters were cleared of all clots and debris.  Irrigation was used.  The uterine incision was again inspected and found to be hemostatic.      The peritoneum was reapproximated with 2.0 Vicryl .  The fascia was closed with 0 Vicryl  in a running fashion (two segments).  The subcutaneous space was reapproximated using 3.0 Plain.      The skin was closed using insorb stapler.  The patient was transferred to the recovery room in stable condition.    Ryan Santos MD  2020  11:32      Electronically signed by Ryan Santos MD at 20 113     Ryan Santos MD at 20 113        See op note    Electronically signed by Ryan Santos MD at 20 113          Physician Progress Notes (last 48 hours) (Notes from 20 1137 through 20 113)      Joselyn Wadsworth APRN at 20 1054            8/3/2020    Name:Comfort Fernandez    MR#:[id number]     PROGRESS NOTE:  Post-Op day 1 S/P    HD:1    Subjective   39 y.o. yo Female  s/p CS at 36w2d doing well. Pain well controlled. Tolerating regular diet and having flatus. Lochia normal.     There is no problem list on file for this patient.       Objective    Vitals  Temp:  Temp:  [97 °F (36.1 °C)-98.6 °F (37 °C)] 97 °F (36.1 °C)  Temp src: Oral  BP:  BP: (100-141)/(60-90) 141/68  Pulse:  Heart Rate:  [54-80] 80  RR:   Resp:  [16-20] 16    General Awake, alert, no distress  Abdomen Soft, non-distended, fundus firm, below umbilicus, appropriately tender  Incision  Intact, no erythema or exudate  Extremities Calves NT bilaterally     I/O last 3 completed shifts:  In: 1500 [I.V.:1500]  Out: 3376 [Urine:2325; Blood:1051]    LABS:   Lab Results   Component Value Date    WBC 9.28 2020    HGB 11.3 (L) 2020    HCT 34.1 2020    MCV 90.0  08/02/2020     08/02/2020       Infant: male       Assessment   1.  POD day 1   2.  MBT= A positive   3.  Desires circ   4.  Labs pending    Plan: Doing well.  Routine postoperative care      Active Problems:   None      VARSHA Kim  8/3/2020 10:54    Electronically signed by Joselyn Wadsworth APRN at 08/03/20 1056       Consult Notes (last 48 hours) (Notes from 08/01/20 1137 through 08/03/20 1137)    No notes of this type exist for this encounter.

## 2020-08-03 NOTE — PROGRESS NOTES
8/3/2020    Name:Comfort Fernandez    MR#:[id number]     PROGRESS NOTE:  Post-Op day 1 S/P    HD:1    Subjective   39 y.o. yo Female  s/p CS at 36w2d doing well. Pain well controlled. Tolerating regular diet and having flatus. Lochia normal.     There is no problem list on file for this patient.       Objective    Vitals  Temp:  Temp:  [97 °F (36.1 °C)-98.6 °F (37 °C)] 97 °F (36.1 °C)  Temp src: Oral  BP:  BP: (100-141)/(60-90) 141/68  Pulse:  Heart Rate:  [54-80] 80  RR:   Resp:  [16-20] 16    General Awake, alert, no distress  Abdomen Soft, non-distended, fundus firm, below umbilicus, appropriately tender  Incision  Intact, no erythema or exudate  Extremities Calves NT bilaterally     I/O last 3 completed shifts:  In: 1500 [I.V.:1500]  Out: 3376 [Urine:2325; Blood:1051]    LABS:   Lab Results   Component Value Date    WBC 9.28 2020    HGB 11.3 (L) 2020    HCT 34.1 2020    MCV 90.0 2020     2020       Infant: male       Assessment   1.  POD day 1   2.  MBT= A positive   3.  Desires circ   4.  Labs pending    Plan: Doing well.  Routine postoperative care      Active Problems:   None      VARSHA Kim  8/3/2020 10:54

## 2020-08-03 NOTE — PLAN OF CARE
Problem: Breastfeeding (Adult,Obstetrics,Pediatric)  Goal: Signs and Symptoms of Listed Potential Problems Will be Absent, Minimized or Managed (Breastfeeding)  Outcome: Ongoing (interventions implemented as appropriate)  Flowsheets (Taken 8/2/2020 1920)  Problems Assessed (Breastfeeding): ineffective breastfeeding; pain; situational response; skin breakdown  Intervention: Provide Support During Feeding Sessions  Flowsheets (Taken 8/2/2020 1920)  Parent/Child Attachment Promotion: caring behavior modeled; cue recognition promoted; skin-to-skin contact encouraged; strengths emphasized; positive reinforcement provided  Intervention: Promote Positive Maternal Experience  Flowsheets (Taken 8/2/2020 1920)  Supportive Measures: active listening utilized  Breastfeeding Support: diary/feeding log utilized; encouragement provided; lactation counseling provided  Intervention: Support Exclusive Breastfeeding Success  Flowsheets (Taken 8/2/2020 1920)  Breastfeeding Assistance: support offered; feeding cue recognition promoted; feeding on demand promoted

## 2020-08-03 NOTE — LACTATION NOTE
08/02/20 1920   Maternal Information   Person Making Referral   (courtesy consult)   Maternal Reason for Referral   (states inverted nipples & pumped w/others x6 weeks)   Infant Reason for Referral   (tried to breastfeed/supplemented w/formula)   Equipment Type   Breast Pump Type double electric, personal  (has pump and will start pumping in the next day or two)   Reproductive Interventions   Breastfeeding Assistance support offered;feeding cue recognition promoted;feeding on demand promoted   Breastfeeding Support diary/feeding log utilized;encouragement provided;lactation counseling provided   Breast Pumping   Breast Pumping Interventions post-feed pumping encouraged   Coping/Psychosocial Interventions   Parent/Child Attachment Promotion caring behavior modeled;cue recognition promoted;skin-to-skin contact encouraged;strengths emphasized;positive reinforcement provided   Supportive Measures active listening utilized   Teaching done as documented under Education. To call lactation services, if mom wants help with feeding or pumping.

## 2020-08-03 NOTE — ANESTHESIA POSTPROCEDURE EVALUATION
Patient: Comfort Fernandez    Procedure Summary     Date:  20 Room / Location:   [facility]    Anesthesia Start:  1027 Anesthesia Stop:  113    Procedure:   SECTION REPEAT (Bilateral Abdomen) Diagnosis:      Surgeon:  Salas Lucero MD Provider:  Ronny Srinivasan MD    Anesthesia Type:  spinal ASA Status:  2 - Emergent          Anesthesia Type: spinal    Vitals  Vitals Value Taken Time   /61 8/3/2020  4:00 AM   Temp 98.3 °F (36.8 °C) 8/3/2020  4:00 AM   Pulse 54 8/3/2020  4:00 AM   Resp 16 8/3/2020  4:00 AM   SpO2 100 % 2020 12:41 PM   Vitals shown include unvalidated device data.        Post Anesthesia Care and Evaluation    Patient location during evaluation: bedside  Patient participation: complete - patient participated  Level of consciousness: awake and alert  Pain management: adequate  Airway patency: patent  Anesthetic complications: No anesthetic complications    Cardiovascular status: acceptable  Respiratory status: acceptable  Hydration status: acceptable  Post Neuraxial Block status: Motor and sensory function returned to baseline and No signs or symptoms of PDPH

## 2020-08-03 NOTE — LACTATION NOTE
Spectra pump and small shield given to mom.  Demonstrated use of shield.  Pump and breastfeeding education done.

## 2020-08-04 LAB
CYTO UR: NORMAL
LAB AP CASE REPORT: NORMAL
LAB AP CLINICAL INFORMATION: NORMAL
LAB AP DIAGNOSIS COMMENT: NORMAL
PATH REPORT.FINAL DX SPEC: NORMAL
PATH REPORT.GROSS SPEC: NORMAL
REF LAB TEST METHOD: NORMAL
REF LAB TEST METHOD: NORMAL

## 2020-08-04 PROCEDURE — 63710000001 DIPHENHYDRAMINE PER 50 MG: Performed by: ANESTHESIOLOGY

## 2020-08-04 PROCEDURE — 25010000002 KETOROLAC TROMETHAMINE PER 15 MG: Performed by: OBSTETRICS & GYNECOLOGY

## 2020-08-04 RX ADMIN — SIMETHICONE CHEW TAB 80 MG 80 MG: 80 TABLET ORAL at 11:21

## 2020-08-04 RX ADMIN — DIPHENHYDRAMINE HYDROCHLORIDE 25 MG: 25 CAPSULE ORAL at 20:48

## 2020-08-04 RX ADMIN — KETOROLAC TROMETHAMINE 30 MG: 30 INJECTION, SOLUTION INTRAMUSCULAR at 17:35

## 2020-08-04 RX ADMIN — OXYCODONE HYDROCHLORIDE AND ACETAMINOPHEN 2 TABLET: 5; 325 TABLET ORAL at 07:09

## 2020-08-04 RX ADMIN — OXYCODONE HYDROCHLORIDE AND ACETAMINOPHEN 2 TABLET: 5; 325 TABLET ORAL at 11:20

## 2020-08-04 RX ADMIN — SIMETHICONE CHEW TAB 80 MG 80 MG: 80 TABLET ORAL at 19:46

## 2020-08-04 RX ADMIN — KETOROLAC TROMETHAMINE 30 MG: 30 INJECTION, SOLUTION INTRAMUSCULAR at 11:21

## 2020-08-04 RX ADMIN — SERTRALINE HYDROCHLORIDE 100 MG: 25 TABLET ORAL at 11:20

## 2020-08-04 RX ADMIN — OXYCODONE HYDROCHLORIDE AND ACETAMINOPHEN 2 TABLET: 5; 325 TABLET ORAL at 00:30

## 2020-08-04 RX ADMIN — KETOROLAC TROMETHAMINE 30 MG: 30 INJECTION, SOLUTION INTRAMUSCULAR at 05:09

## 2020-08-04 RX ADMIN — OXYCODONE HYDROCHLORIDE AND ACETAMINOPHEN 2 TABLET: 5; 325 TABLET ORAL at 15:36

## 2020-08-04 RX ADMIN — OXYCODONE HYDROCHLORIDE AND ACETAMINOPHEN 2 TABLET: 5; 325 TABLET ORAL at 19:46

## 2020-08-04 NOTE — PROGRESS NOTES
2020    Name:Comfort Fernandez    MR#:[id number]     PROGRESS NOTE:  Post-Op 2 S/P    HD:2    Subjective   39 y.o. yo Female  s/p CS at 36w2d doing well. Pain well controlled. Tolerating regular diet and having flatus. Lochia normal.       Objective    Vitals  Temp:  Temp:  [97.9 °F (36.6 °C)-98.7 °F (37.1 °C)] 98 °F (36.7 °C)  Temp src: Axillary  BP:  BP: (117-135)/(64-83) 135/83  Pulse:  Heart Rate:  [56-74] 67  RR:   Resp:  [16-18] 16    General Awake, alert, no distress  Abdomen Soft, non-distended, fundus firm, below umbilicus, appropriately tender  Incision  Intact, no erythema or exudate  Extremities Calves NT bilaterally     I/O last 3 completed shifts:  In: -   Out: 2300 [Urine:2300]    LABS:   Lab Results   Component Value Date    WBC 11.05 (H) 2020    HGB 10.3 (L) 2020    HCT 31.8 (L) 2020    MCV 91.1 2020     2020       Infant: male       Assessment   1.  POD 2, doing well   2.  Baby boy well; desires circ       Plan: Doing well.  Routine postoperative care      Active Problems:   None      Sheila Chavez, APRN  2020 08:44

## 2020-08-04 NOTE — PLAN OF CARE
Problem: Patient Care Overview  Goal: Plan of Care Review  Outcome: Ongoing (interventions implemented as appropriate)  Flowsheets  Taken 8/4/2020 0352  Progress: improving  Outcome Summary: patient up walking/voiding, VSS, pain controlled with meds, bottlefeeding and pumping  Taken 8/3/2020 2014  Plan of Care Reviewed With: patient

## 2020-08-04 NOTE — LACTATION NOTE
08/04/20 0950   Maternal Information   Date of Referral 08/04/20   Person Making Referral other (see comments)  (courtesy)   Maternal Infant Feeding   Maternal Emotional State independent;relaxed   Equipment Type   Breast Pump Type double electric, personal     Mom states SLP came to visit this am, states that SLP said baby has tight lip and tongue, gave Dr kirkpatrick's bottle. Mom is nursing with shield, but also pumping, and supp with form. States she doesn't mind to exlusively pump and not BF as she did that with other child. Enc to discuss with Peds. Enc to call clinic post DC if needed or followup with SLP/LC

## 2020-08-05 PROCEDURE — 25010000002 KETOROLAC TROMETHAMINE PER 15 MG: Performed by: OBSTETRICS & GYNECOLOGY

## 2020-08-05 RX ADMIN — OXYCODONE HYDROCHLORIDE AND ACETAMINOPHEN 2 TABLET: 5; 325 TABLET ORAL at 14:58

## 2020-08-05 RX ADMIN — SIMETHICONE CHEW TAB 80 MG 80 MG: 80 TABLET ORAL at 14:58

## 2020-08-05 RX ADMIN — IBUPROFEN 600 MG: 600 TABLET, FILM COATED ORAL at 16:00

## 2020-08-05 RX ADMIN — SERTRALINE HYDROCHLORIDE 100 MG: 25 TABLET ORAL at 09:53

## 2020-08-05 RX ADMIN — OXYCODONE HYDROCHLORIDE AND ACETAMINOPHEN 2 TABLET: 5; 325 TABLET ORAL at 04:28

## 2020-08-05 RX ADMIN — OXYCODONE HYDROCHLORIDE AND ACETAMINOPHEN 2 TABLET: 5; 325 TABLET ORAL at 00:02

## 2020-08-05 RX ADMIN — KETOROLAC TROMETHAMINE 30 MG: 30 INJECTION, SOLUTION INTRAMUSCULAR at 06:34

## 2020-08-05 RX ADMIN — OXYCODONE HYDROCHLORIDE AND ACETAMINOPHEN 2 TABLET: 5; 325 TABLET ORAL at 19:10

## 2020-08-05 RX ADMIN — IBUPROFEN 600 MG: 600 TABLET, FILM COATED ORAL at 22:25

## 2020-08-05 RX ADMIN — OXYCODONE HYDROCHLORIDE AND ACETAMINOPHEN 2 TABLET: 5; 325 TABLET ORAL at 09:53

## 2020-08-05 RX ADMIN — SIMETHICONE CHEW TAB 80 MG 80 MG: 80 TABLET ORAL at 09:53

## 2020-08-05 RX ADMIN — KETOROLAC TROMETHAMINE 30 MG: 30 INJECTION, SOLUTION INTRAMUSCULAR at 00:02

## 2020-08-05 NOTE — PROGRESS NOTES
2020    Name:Comfort Fernandez    MR#:[id number]     PROGRESS NOTE:  Post-Op day 3 S/P    HD:3    Subjective   39 y.o. yo Female  s/p CS at 36w2d doing well. Pain well controlled. Tolerating regular diet and having flatus. Lochia normal.     There is no problem list on file for this patient.       Objective    Vitals  Temp:  Temp:  [98.2 °F (36.8 °C)-99.1 °F (37.3 °C)] 98.2 °F (36.8 °C)  Temp src: Oral  BP:  BP: (125-140)/(65-75) 125/66  Pulse:  Heart Rate:  [64-77] 66  RR:   Resp:  [16-18] 16    General Awake, alert, no distress  Abdomen Soft, non-distended, fundus firm, below umbilicus, appropriately tender  Incision  Intact, no erythema or exudate  Extremities Calves NT bilaterally     I/O last 3 completed shifts:  In: -   Out: 600 [Urine:600]    LABS:   Lab Results   Component Value Date    WBC 11.05 (H) 2020    HGB 10.3 (L) 2020    HCT 31.8 (L) 2020    MCV 91.1 2020     2020       Infant: male       Assessment   1.  POD day 3   2.  Infant male, desires circ   3.  Using Toradol and percocet for pain    Plan: Doing well.  Routine postoperative care            Plan discharge tomorrow. (Baby supposed to be discharged on Friday per pt.)    Active Problems:   None      VARSHA Kim  2020 08:54

## 2020-08-05 NOTE — PLAN OF CARE
Problem: Patient Care Overview  Goal: Plan of Care Review  Outcome: Ongoing (interventions implemented as appropriate)  Flowsheets  Taken 8/5/2020 0356  Progress: improving  Outcome Summary: pain controlled with meds, VSS. patient was upset about baby having to be scored, but has calmed down and now helping staff score baby.  Taken 8/4/2020 1946  Plan of Care Reviewed With: patient

## 2020-08-06 LAB
ALP SERPL-CCNC: 93 U/L (ref 39–117)
ALT SERPL W P-5'-P-CCNC: 13 U/L (ref 1–33)
AST SERPL-CCNC: 19 U/L (ref 1–32)
BASOPHILS # BLD AUTO: 0.02 10*3/MM3 (ref 0–0.2)
BASOPHILS NFR BLD AUTO: 0.3 % (ref 0–1.5)
BILIRUB SERPL-MCNC: 0.2 MG/DL (ref 0–1.2)
CREAT SERPL-MCNC: 0.62 MG/DL (ref 0.57–1)
DEPRECATED RDW RBC AUTO: 46.3 FL (ref 37–54)
EOSINOPHIL # BLD AUTO: 0.19 10*3/MM3 (ref 0–0.4)
EOSINOPHIL NFR BLD AUTO: 2.8 % (ref 0.3–6.2)
ERYTHROCYTE [DISTWIDTH] IN BLOOD BY AUTOMATED COUNT: 13.5 % (ref 12.3–15.4)
HCT VFR BLD AUTO: 29.7 % (ref 34–46.6)
HGB BLD-MCNC: 9.4 G/DL (ref 12–15.9)
IMM GRANULOCYTES # BLD AUTO: 0.02 10*3/MM3 (ref 0–0.05)
IMM GRANULOCYTES NFR BLD AUTO: 0.3 % (ref 0–0.5)
LDH SERPL-CCNC: 179 U/L (ref 135–214)
LYMPHOCYTES # BLD AUTO: 1.89 10*3/MM3 (ref 0.7–3.1)
LYMPHOCYTES NFR BLD AUTO: 27.9 % (ref 19.6–45.3)
MCH RBC QN AUTO: 29.3 PG (ref 26.6–33)
MCHC RBC AUTO-ENTMCNC: 31.6 G/DL (ref 31.5–35.7)
MCV RBC AUTO: 92.5 FL (ref 79–97)
MONOCYTES # BLD AUTO: 0.45 10*3/MM3 (ref 0.1–0.9)
MONOCYTES NFR BLD AUTO: 6.6 % (ref 5–12)
NEUTROPHILS NFR BLD AUTO: 4.21 10*3/MM3 (ref 1.7–7)
NEUTROPHILS NFR BLD AUTO: 62.1 % (ref 42.7–76)
NRBC BLD AUTO-RTO: 0 /100 WBC (ref 0–0.2)
PLATELET # BLD AUTO: 229 10*3/MM3 (ref 140–450)
PMV BLD AUTO: 10.5 FL (ref 6–12)
RBC # BLD AUTO: 3.21 10*6/MM3 (ref 3.77–5.28)
URATE SERPL-MCNC: 2.6 MG/DL (ref 2.4–5.7)
WBC # BLD AUTO: 6.78 10*3/MM3 (ref 3.4–10.8)

## 2020-08-06 PROCEDURE — 82247 BILIRUBIN TOTAL: CPT | Performed by: NURSE PRACTITIONER

## 2020-08-06 PROCEDURE — 84075 ASSAY ALKALINE PHOSPHATASE: CPT | Performed by: NURSE PRACTITIONER

## 2020-08-06 PROCEDURE — 82565 ASSAY OF CREATININE: CPT | Performed by: NURSE PRACTITIONER

## 2020-08-06 PROCEDURE — 84550 ASSAY OF BLOOD/URIC ACID: CPT | Performed by: NURSE PRACTITIONER

## 2020-08-06 PROCEDURE — 63710000001 ONDANSETRON PER 8 MG: Performed by: OBSTETRICS & GYNECOLOGY

## 2020-08-06 PROCEDURE — 84450 TRANSFERASE (AST) (SGOT): CPT | Performed by: NURSE PRACTITIONER

## 2020-08-06 PROCEDURE — 85025 COMPLETE CBC W/AUTO DIFF WBC: CPT | Performed by: NURSE PRACTITIONER

## 2020-08-06 PROCEDURE — 83615 LACTATE (LD) (LDH) ENZYME: CPT | Performed by: NURSE PRACTITIONER

## 2020-08-06 PROCEDURE — 84460 ALANINE AMINO (ALT) (SGPT): CPT | Performed by: NURSE PRACTITIONER

## 2020-08-06 RX ORDER — NIFEDIPINE 30 MG/1
30 TABLET, EXTENDED RELEASE ORAL 2 TIMES DAILY
Status: DISCONTINUED | OUTPATIENT
Start: 2020-08-06 | End: 2020-08-07 | Stop reason: HOSPADM

## 2020-08-06 RX ADMIN — SIMETHICONE CHEW TAB 80 MG 80 MG: 80 TABLET ORAL at 08:31

## 2020-08-06 RX ADMIN — IBUPROFEN 600 MG: 600 TABLET, FILM COATED ORAL at 12:26

## 2020-08-06 RX ADMIN — SIMETHICONE CHEW TAB 80 MG 80 MG: 80 TABLET ORAL at 18:34

## 2020-08-06 RX ADMIN — OXYCODONE HYDROCHLORIDE AND ACETAMINOPHEN 2 TABLET: 5; 325 TABLET ORAL at 15:02

## 2020-08-06 RX ADMIN — OXYCODONE HYDROCHLORIDE AND ACETAMINOPHEN 2 TABLET: 5; 325 TABLET ORAL at 11:19

## 2020-08-06 RX ADMIN — NIFEDIPINE 30 MG: 30 TABLET, FILM COATED, EXTENDED RELEASE ORAL at 23:29

## 2020-08-06 RX ADMIN — OXYCODONE HYDROCHLORIDE AND ACETAMINOPHEN 2 TABLET: 5; 325 TABLET ORAL at 06:42

## 2020-08-06 RX ADMIN — SIMETHICONE CHEW TAB 80 MG 80 MG: 80 TABLET ORAL at 01:50

## 2020-08-06 RX ADMIN — SIMETHICONE CHEW TAB 80 MG 80 MG: 80 TABLET ORAL at 12:26

## 2020-08-06 RX ADMIN — OXYCODONE HYDROCHLORIDE AND ACETAMINOPHEN 2 TABLET: 5; 325 TABLET ORAL at 00:29

## 2020-08-06 RX ADMIN — SERTRALINE HYDROCHLORIDE 100 MG: 25 TABLET ORAL at 08:31

## 2020-08-06 RX ADMIN — IBUPROFEN 600 MG: 600 TABLET, FILM COATED ORAL at 06:07

## 2020-08-06 RX ADMIN — NIFEDIPINE 30 MG: 30 TABLET, FILM COATED, EXTENDED RELEASE ORAL at 09:42

## 2020-08-06 RX ADMIN — OXYCODONE HYDROCHLORIDE AND ACETAMINOPHEN 2 TABLET: 5; 325 TABLET ORAL at 19:11

## 2020-08-06 RX ADMIN — IBUPROFEN 600 MG: 600 TABLET, FILM COATED ORAL at 18:34

## 2020-08-06 RX ADMIN — ONDANSETRON HYDROCHLORIDE 4 MG: 4 TABLET, FILM COATED ORAL at 12:55

## 2020-08-06 NOTE — PROGRESS NOTES
2020    Name:Comfort Fernandez    MR#:[id number]     PROGRESS NOTE:  Post-Op 4 S/P    HD:4    Subjective   39 y.o. yo Female  s/p CS at 36w2d doing well. Pain well controlled. Tolerating regular diet and having flatus. Lochia normal.  She denies headache, visual changes.      Objective    Vitals  Temp:  Temp:  [98.6 °F (37 °C)-99 °F (37.2 °C)] 98.6 °F (37 °C)  Temp src: Oral  BP:  BP: (131-158)/(69-82) 156/75  Pulse:  Heart Rate:  [56-93] 56  RR:   Resp:  [16] 16    General Awake, alert, no distress  Abdomen Soft, non-distended, fundus firm, below umbilicus, appropriately tender  Incision  Intact, no erythema or exudate  Extremities Calves NT bilaterally     No intake/output data recorded.    LABS:   Lab Results   Component Value Date    WBC 11.05 (H) 2020    HGB 10.3 (L) 2020    HCT 31.8 (L) 2020    MCV 91.1 2020     2020       Infant: male       Assessment   1.  POD 4, doing well   2.  Moderately elevated BPs x 24 hours; asymptomatic; no hx   3.  Desires circ    Plan: Doing well.  Routine postoperative care  PEP now.  Start Procardia 30 XL bid.  Hold dc today.  D/w LOS.      Active Problems:   None      Sheila Chavez, APRN  2020 08:44

## 2020-08-06 NOTE — PLAN OF CARE
Problem: Patient Care Overview  Goal: Plan of Care Review  Outcome: Ongoing (interventions implemented as appropriate)  Flowsheets  Taken 8/6/2020 0530  Progress: improving  Taken 8/5/2020 2000  Plan of Care Reviewed With: patient  Taken 8/5/2020 0356  Outcome Summary: pain controlled with meds, VSS. patient was upset about baby having to be scored, but has calmed down and now helping staff score baby.

## 2020-08-07 VITALS
SYSTOLIC BLOOD PRESSURE: 136 MMHG | HEART RATE: 79 BPM | TEMPERATURE: 98.6 F | RESPIRATION RATE: 18 BRPM | OXYGEN SATURATION: 100 % | DIASTOLIC BLOOD PRESSURE: 69 MMHG | BODY MASS INDEX: 27.46 KG/M2 | WEIGHT: 155 LBS

## 2020-08-07 RX ORDER — NIFEDIPINE 30 MG/1
30 TABLET, FILM COATED, EXTENDED RELEASE ORAL 2 TIMES DAILY
Qty: 60 TABLET | Refills: 0 | Status: SHIPPED | OUTPATIENT
Start: 2020-08-07 | End: 2021-04-07

## 2020-08-07 RX ORDER — IBUPROFEN 600 MG/1
600 TABLET ORAL EVERY 6 HOURS PRN
Qty: 30 TABLET | Refills: 0 | Status: SHIPPED | OUTPATIENT
Start: 2020-08-07 | End: 2023-02-26

## 2020-08-07 RX ORDER — OXYCODONE HYDROCHLORIDE AND ACETAMINOPHEN 5; 325 MG/1; MG/1
1 TABLET ORAL EVERY 4 HOURS PRN
Qty: 18 TABLET | Refills: 0 | Status: SHIPPED | OUTPATIENT
Start: 2020-08-07 | End: 2020-08-10

## 2020-08-07 RX ORDER — SERTRALINE HYDROCHLORIDE 25 MG/1
100 TABLET, FILM COATED ORAL ONCE
Status: DISCONTINUED | OUTPATIENT
Start: 2020-08-07 | End: 2020-08-07 | Stop reason: HOSPADM

## 2020-08-07 RX ADMIN — OXYCODONE HYDROCHLORIDE AND ACETAMINOPHEN 2 TABLET: 5; 325 TABLET ORAL at 06:40

## 2020-08-07 RX ADMIN — IBUPROFEN 600 MG: 600 TABLET, FILM COATED ORAL at 06:41

## 2020-08-07 RX ADMIN — OXYCODONE HYDROCHLORIDE AND ACETAMINOPHEN 2 TABLET: 5; 325 TABLET ORAL at 00:35

## 2020-08-07 RX ADMIN — SERTRALINE HYDROCHLORIDE 100 MG: 25 TABLET ORAL at 08:28

## 2020-08-07 RX ADMIN — IBUPROFEN 600 MG: 600 TABLET, FILM COATED ORAL at 00:35

## 2020-08-07 NOTE — PAYOR COMM NOTE
Sourav Goncalves (39 y.o. Female)   Inpatient Clinicals  Patient stayed additional day    Date of Birth Social Security Number Address Home Phone MRN    1980  [id number]  [facility] [address] [phone] [id number]    Shinto Marital Status          None Single       Admission Date Admission Type Admitting Provider Attending Provider Department, Room/Bed    8/2/20 Elective Salas Lucero MD Parrott, Olson, MD [facility] MOTHER BABY 4B, N426/1    Discharge Date Discharge Disposition Discharge Destination         Home or Self Care              Attending Provider:  Salas Lucero MD    Allergies:  Keflex [Cephalexin], Neurontin [Gabapentin], Vancomycin    Isolation:  None   Infection:  None   Code Status:  CPR    Ht:  --   Wt:  70.3 kg (155 lb)    Admission Cmt:  None   Principal Problem:  None                Active Insurance as of 8/2/2020     Primary Coverage     Payor Plan Insurance Group Employer/Plan Group    [employer]HE[employer] MEDICAID [employer]M MEDICAID [id number]     Payor Plan Address Payor Plan Phone Number Payor Plan Fax Number Effective Dates    [address] [address] [phone]  5/1/2020 - None Entered    [facility] [phone]       Subscriber Name Subscriber Birth Date Member ID       SOURAV GONCALVES 1980 [id number]                 Emergency Contacts      (Rel.) Home Phone Work Phone Mobile Phone    CONTACT, NO (Friend) -- -- [phone]            Vital Signs (last 3 days)     Date/Time   Temp   Temp src   Pulse   Resp   BP    08/07/20 0800   98.6 (37)   Oral   79   18   136/69    08/07/20 0339   --   --   69   18   127/69    08/07/20 0036   --   --   60   18   128/60    08/06/20 2330   99.2 (37.3)   Oral   --   --   --    08/06/20 2000   --   --   52   16   140/82    08/06/20 1500   98.3 (36.8)   --   63   16   130/67    08/06/20 1200   --   --   --   --   145/71    08/06/20 0822   --   --   --   --   156/75    08/06/20 0700   98.6 (37)   --   56    16   158/74    08/06/20 0000   98.9 (37.2)   Oral   66   16   131/69    08/05/20 2000   --   --   61   16   134/75    08/05/20 1600   99 (37.2)   --   93   16   155/82    08/05/20 0700   98.2 (36.8)   --   66   16   125/66    08/05/20 0000   99.1 (37.3)   Oral   77   16   139/65    08/04/20 1500   98.6 (37)   Oral   64   18   140/75    08/04/20 0700   98 (36.7)   Axillary   67   16   135/83    08/04/20 0400   98.2 (36.8)   Oral   72   16   117/70    08/04/20 0000   98.6 (37)   Oral   72   16   128/69              Current Facility-Administered Medications   Medication Dose Route Frequency Provider Last Rate Last Dose   • diphenhydrAMINE (BENADRYL) capsule 25 mg  25 mg Oral Q4H PRN Ronny Srinivasan MD   25 mg at 08/04/20 2048    Or   • diphenhydrAMINE (BENADRYL) injection 25 mg  25 mg Intravenous Q4H PRN Ronny Srinivasan MD   25 mg at 08/03/20 0844    Or   • diphenhydrAMINE (BENADRYL) injection 25 mg  25 mg Intramuscular Q4H PRN Ronny Srinivasan MD       • ibuprofen (ADVIL,MOTRIN) tablet 600 mg  600 mg Oral Q6H PRN Ryan Santos MD   600 mg at 08/07/20 0641   • ketorolac (TORADOL) injection 30 mg  30 mg Intravenous Q6H PRN Ryan Santos MD   30 mg at 08/05/20 0634   • lactated ringers infusion  125 mL/hr Intravenous Continuous Shaun Cedeño  mL/hr at 08/02/20 1817 125 mL/hr at 08/02/20 1817   • lanolin cream   Topical Q1H PRN Ryan Santos MD       • NIFEdipine XL (PROCARDIA XL) 24 hr tablet 30 mg  30 mg Oral BID Sheila Chavez APRN   30 mg at 08/06/20 2329   • ondansetron (ZOFRAN) tablet 4 mg  4 mg Oral Q8H PRN Ryan Santos MD   4 mg at 08/06/20 1255   • oxyCODONE-acetaminophen (PERCOCET) 5-325 MG per tablet 2 tablet  2 tablet Oral Q4H PRN Ryan Santos MD   2 tablet at 08/07/20 0640   • sertraline (ZOLOFT) tablet 100 mg  100 mg Oral Daily Joselyn Wadsworth APRN   100 mg at 08/07/20 0828   • sertraline (ZOLOFT) tablet 100 mg  100 mg Oral Once Salas Lucero MD       •  simethicone (MYLICON) chewable tablet 80 mg  80 mg Oral 4x Daily PRN Salas Lucero MD   80 mg at 08/06/20 1834     Lab Results (last 72 hours)     Procedure Component Value Units Date/Time    Preeclampsia Panel [[id number]]  (Normal) Collected:  08/06/20 1035    Specimen:  Blood Updated:  08/06/20 1138     Alkaline Phosphatase 93 U/L      ALT (SGPT) 13 U/L      AST (SGOT) 19 U/L      Creatinine 0.62 mg/dL      Total Bilirubin 0.2 mg/dL       U/L      Uric Acid 2.6 mg/dL     CBC & Differential [[id number]] Collected:  08/06/20 1035    Specimen:  Blood Updated:  08/06/20 1058    Narrative:       The following orders were created for panel order CBC & Differential.  Procedure                               Abnormality         Status                     ---------                               -----------         ------                     CBC Auto Differential[[id number]]        Abnormal            Final result                 Please view results for these tests on the individual orders.    CBC Auto Differential [[id number]]  (Abnormal) Collected:  08/06/20 1035    Specimen:  Blood Updated:  08/06/20 1058     WBC 6.78 10*3/mm3      RBC 3.21 10*6/mm3      Hemoglobin 9.4 g/dL      Hematocrit 29.7 %      MCV 92.5 fL      MCH 29.3 pg      MCHC 31.6 g/dL      RDW 13.5 %      RDW-SD 46.3 fl      MPV 10.5 fL      Platelets 229 10*3/mm3      Neutrophil % 62.1 %      Lymphocyte % 27.9 %      Monocyte % 6.6 %      Eosinophil % 2.8 %      Basophil % 0.3 %      Immature Grans % 0.3 %      Neutrophils, Absolute 4.21 10*3/mm3      Lymphocytes, Absolute 1.89 10*3/mm3      Monocytes, Absolute 0.45 10*3/mm3      Eosinophils, Absolute 0.19 10*3/mm3      Basophils, Absolute 0.02 10*3/mm3      Immature Grans, Absolute 0.02 10*3/mm3      nRBC 0.0 /100 WBC     Tissue Pathology Exam [[id number]] Collected:  08/02/20 1104    Specimen:  Tissue from Fallopian Tubes, Bilateral Updated:  08/04/20 0927     Case Report --     Surgical Pathology  Report                         Case: [id number]                                  Authorizing Provider:  Ryan Santos MD    Collected:           2020 11:04 AM          Ordering Location:     [facility]   Received:            2020 07:04 AM                                 LABOR DELIVERY                                                               Pathologist:           Michoacano Lopez MD                                                            Specimen:    Fallopian Tubes, Bilateral                                                                  Clinical Information --     The working history is a 3 week 2/7 day gestational age tillman placenta for repeat  section with bilateral tubal ligation.         Final Diagnosis --      FALLOPIAN TUBES, BILATERAL SEGEMENTECTOMIES:  Complete transsections.  See comment.    GJK/dlb           Comment --     Sections show decidualized endometrial stroma on the serosal surface of the fallopian tubes without endometrial glands or hemosiderin laden macrophages.  No malignancy is identified.         Gross Description --     Received in formalin labeled as bilateral fallopian tubes and consists of two cylinderical segments of fallopian tubes which are undesignated as to laterality.  The segments average 1.1 cm in length by 0.5 cm in diameter and display tan/purple, unremarkable serosa.  No fimbria are noted.  The cut surfaces are tan/white and unremarkable.  The external surface of one segment is differentially inked blue.  Both fallopian tube segments are submitted entirely and intact in 1 cassette, to be sectioned at the time of embedding.  CLB/dlb        Microscopic Description --     The slides are reviewed and demonstrate histopathologic features supporting the above rendered diagnosis.                Imaging Results (Last 72 Hours)     ** No results found for the last 72 hours. **           Physician Progress Notes (last 72 hours)  (Notes from 20 through 20)      Sheila Chavez APRN at 20 0844            2020    Name:Comfort Fernandez    MR#:[id number]     PROGRESS NOTE:  Post-Op 4 S/P    HD:4    Subjective   39 y.o. yo Female  s/p CS at 36w2d doing well. Pain well controlled. Tolerating regular diet and having flatus. Lochia normal.  She denies headache, visual changes.      Objective    Vitals  Temp:  Temp:  [98.6 °F (37 °C)-99 °F (37.2 °C)] 98.6 °F (37 °C)  Temp src: Oral  BP:  BP: (131-158)/(69-82) 156/75  Pulse:  Heart Rate:  [56-93] 56  RR:   Resp:  [16] 16    General Awake, alert, no distress  Abdomen Soft, non-distended, fundus firm, below umbilicus, appropriately tender  Incision  Intact, no erythema or exudate  Extremities Calves NT bilaterally     No intake/output data recorded.    LABS:   Lab Results   Component Value Date    WBC 11.05 (H) 2020    HGB 10.3 (L) 2020    HCT 31.8 (L) 2020    MCV 91.1 2020     2020       Infant: male       Assessment   1.  POD 4, doing well   2.  Moderately elevated BPs x 24 hours; asymptomatic; no hx   3.  Desires circ    Plan: Doing well.  Routine postoperative care  PEP now.  Start Procardia 30 XL bid.  Hold dc today.  D/w LOS.      Active Problems:   None      VARSHA Bolivar  2020 08:44    Electronically signed by Sheila Chavez APRN at 20 0846     Joselyn Wadsworth APRN at 20 0854            2020    Name:Comfort Fernandez    MR#:[id number]     PROGRESS NOTE:  Post-Op day 3 S/P    HD:3    Subjective   39 y.o. yo Female  s/p CS at 36w2d doing well. Pain well controlled. Tolerating regular diet and having flatus. Lochia normal.     There is no problem list on file for this patient.       Objective    Vitals  Temp:  Temp:  [98.2 °F (36.8 °C)-99.1 °F (37.3 °C)] 98.2 °F (36.8 °C)  Temp src: Oral  BP:  BP: (125-140)/(65-75) 125/66  Pulse:  Heart Rate:  [64-77]  66  RR:   Resp:  [16-18] 16    General Awake, alert, no distress  Abdomen Soft, non-distended, fundus firm, below umbilicus, appropriately tender  Incision  Intact, no erythema or exudate  Extremities Calves NT bilaterally     I/O last 3 completed shifts:  In: -   Out: 600 [Urine:600]    LABS:   Lab Results   Component Value Date    WBC 11.05 (H) 08/03/2020    HGB 10.3 (L) 08/03/2020    HCT 31.8 (L) 08/03/2020    MCV 91.1 08/03/2020     08/03/2020       Infant: male       Assessment   1.  POD day 3   2.  Infant male, desires circ   3.  Using Toradol and percocet for pain    Plan: Doing well.  Routine postoperative care            Plan discharge tomorrow. (Baby supposed to be discharged on Friday per pt.)    Active Problems:   None      VARSHA Kim  8/5/2020 08:54    Electronically signed by Joselyn Wadsworth APRN at 08/05/20 0856       Consult Notes (last 72 hours) (Notes from 08/04/20 0924 through 08/07/20 0924)    No notes of this type exist for this encounter.

## 2020-08-07 NOTE — PAYOR COMM NOTE
Sourav Goncalves (39 y.o. Female)   Auth#[id number]  Clinical Update/Discharge Summary  ATTN: Birdie    Date of Birth Social Security Number Address Home Phone MRN    1980  [id number]  [facility] [address] [phone] [id number]    [employer] Marital Status          None Single       Admission Date Admission Type Admitting Provider Attending Provider Department, Room/Bed    8/2/20 Elective Salas Lucero MD Parrott, [place], MD [facility] MOTHER BABY 4B, N426/1    Discharge Date Discharge Disposition Discharge Destination         Home or Self Care              Attending Provider:  Salas Lucero MD    Allergies:  Keflex [Cephalexin], Neurontin [Gabapentin], Vancomycin    Isolation:  None   Infection:  None   Code Status:  CPR    Ht:  --   Wt:  70.3 kg (155 lb)    Admission Cmt:  None   Principal Problem:  None                Active Insurance as of 8/2/2020     Primary Coverage     Payor Plan Insurance Group Employer/Plan Group    [place] MED[place] Replaced by [facility] MEDICAID [id number]     Payor Plan Address Payor Plan Phone Number Payor Plan Fax Number Effective Dates    [address] [address] [phone]  5/1/2020 - None Entered    [facility] [id number]       Subscriber Name Subscriber Birth Date Member ID       SOURAV GONCALVES 1980 [id number]                 Emergency Contacts      (Rel.) Home Phone Work Phone [place] Phone    CONTACT, NO (Friend) -- -- [phone]            Vital Signs (last day)     Date/Time   Temp   Temp src   Pulse   Resp   BP   Patient Position   SpO2    08/07/20 0800   98.6 (37)   Oral   79   18   136/69   --   --    08/07/20 0339   --   --   69   18   127/69   --   --    08/07/20 0036   --   --   60   18   128/60   --   --    08/06/20 2330   99.2 (37.3)   Oral   --   --   --   --   --    08/06/20 2000   --   --   52   16   140/82   --   --    08/06/20 1500   98.3 (36.8)   --   63   16   130/67   --   --    08/06/20 1200   --   --   --   --    145/71   --   --    08/06/20 0822   --   --   --   --   156/75   --   --    08/06/20 0700   98.6 (37)   --   56   16   158/74   --   --    08/06/20 0000   98.9 (37.2)   Oral   66   16   131/69   --   --                Current Facility-Administered Medications   Medication Dose Route Frequency Provider Last Rate Last Dose   • diphenhydrAMINE (BENADRYL) capsule 25 mg  25 mg Oral Q4H PRN Ronny Srinivasan MD   25 mg at 08/04/20 2048    Or   • diphenhydrAMINE (BENADRYL) injection 25 mg  25 mg Intravenous Q4H PRN Ronny Srinivasan MD   25 mg at 08/03/20 0844    Or   • diphenhydrAMINE (BENADRYL) injection 25 mg  25 mg Intramuscular Q4H PRN Ronny Srinivasan MD       • ibuprofen (ADVIL,MOTRIN) tablet 600 mg  600 mg Oral Q6H PRN Ryan Santos MD   600 mg at 08/07/20 0641   • ketorolac (TORADOL) injection 30 mg  30 mg Intravenous Q6H PRN Ryan Santos MD   30 mg at 08/05/20 0634   • lactated ringers infusion  125 mL/hr Intravenous Continuous Shaun Cedeño  mL/hr at 08/02/20 1817 125 mL/hr at 08/02/20 1817   • lanolin cream   Topical Q1H PRN Ryan Santos MD       • NIFEdipine XL (PROCARDIA XL) 24 hr tablet 30 mg  30 mg Oral BID Sheila Chavez APRN   30 mg at 08/06/20 2329   • ondansetron (ZOFRAN) tablet 4 mg  4 mg Oral Q8H PRN Ryan Santos MD   4 mg at 08/06/20 1255   • oxyCODONE-acetaminophen (PERCOCET) 5-325 MG per tablet 2 tablet  2 tablet Oral Q4H PRN Ryan Santos MD   2 tablet at 08/07/20 0640   • sertraline (ZOLOFT) tablet 100 mg  100 mg Oral Daily Joselyn Wadsworth APRN   100 mg at 08/07/20 0828   • sertraline (ZOLOFT) tablet 100 mg  100 mg Oral Once Salas Lucero MD       • simethicone (MYLICON) chewable tablet 80 mg  80 mg Oral 4x Daily PRN Salas Lucero MD   80 mg at 08/06/20 5693       Lab Results (last 24 hours)     Procedure Component Value Units Date/Time    Preeclampsia Panel [[id number]]  (Normal) Collected:  08/06/20 1035    Specimen:  Blood Updated:  08/06/20  1138     Alkaline Phosphatase 93 U/L      ALT (SGPT) 13 U/L      AST (SGOT) 19 U/L      Creatinine 0.62 mg/dL      Total Bilirubin 0.2 mg/dL       U/L      Uric Acid 2.6 mg/dL     CBC & Differential [[id number]] Collected:  08/06/20 1035    Specimen:  Blood Updated:  08/06/20 1058    Narrative:       The following orders were created for panel order CBC & Differential.  Procedure                               Abnormality         Status                     ---------                               -----------         ------                     CBC Auto Differential[[id number]]        Abnormal            Final result                 Please view results for these tests on the individual orders.    CBC Auto Differential [[id number]]  (Abnormal) Collected:  08/06/20 1035    Specimen:  Blood Updated:  08/06/20 1058     WBC 6.78 10*3/mm3      RBC 3.21 10*6/mm3      Hemoglobin 9.4 g/dL      Hematocrit 29.7 %      MCV 92.5 fL      MCH 29.3 pg      MCHC 31.6 g/dL      RDW 13.5 %      RDW-SD 46.3 fl      MPV 10.5 fL      Platelets 229 10*3/mm3      Neutrophil % 62.1 %      Lymphocyte % 27.9 %      Monocyte % 6.6 %      Eosinophil % 2.8 %      Basophil % 0.3 %      Immature Grans % 0.3 %      Neutrophils, Absolute 4.21 10*3/mm3      Lymphocytes, Absolute 1.89 10*3/mm3      Monocytes, Absolute 0.45 10*3/mm3      Eosinophils, Absolute 0.19 10*3/mm3      Basophils, Absolute 0.02 10*3/mm3      Immature Grans, Absolute 0.02 10*3/mm3      nRBC 0.0 /100 WBC         Imaging Results (Last 24 Hours)     ** No results found for the last 24 hours. **        Physician Progress Notes (last 24 hours) (Notes from 08/06/20 1046 through 08/07/20 1046)    No notes of this type exist for this encounter.         Consult Notes (last 24 hours) (Notes from 08/06/20 1046 through 08/07/20 1046)    No notes of this type exist for this encounter.              Discharge Summary      Sheila Chavez APRN at 08/07/20 0918          Discharge Summary    Date  of Admission: 2020  Date of Discharge:  2020      Patient: Comfort Fernandez      MR#:[id number]    Primary Surgeon/OB: Salas Lucero MD      Discharge Diagnosis:  section at 36w2d    Procedures:  , Low Transverse     2020    10:49 AM        Hospital Course  Patient is a 39 y.o. female  at 36w2d status post  section.  Her BP was mildly elevated a few times one day 1-3.  It worsened to moderate BPs on day 4.  She was started on Procardia 30 XL bid and BP is wnl today.  She is asymptomatic.  Patient was advanced to regular diet on postoperative day#1.  On discharge, ambulating, tolerating a regular diet without any difficulties and her incision is dry, clean and intact.  Baby circ done.      Infant:   male  fetus 2682 g (5 lb 14.6 oz)  with Apgar scores of 8  , 9   at five minutes.    Condition on Discharge:  Stable    Vital Signs  Temp:  [98.3 °F (36.8 °C)-99.2 °F (37.3 °C)] 98.6 °F (37 °C)  Heart Rate:  [52-79] 79  Resp:  [16-18] 18  BP: (127-145)/(60-82) 136/69    Lab Results   Component Value Date    WBC 6.78 2020    HGB 9.4 (L) 2020    HCT 29.7 (L) 2020    MCV 92.5 2020     2020       Discharge Disposition  Home or Self Care    Discharge Medications     Discharge Medications      New Medications      Instructions Start Date   ibuprofen 600 MG tablet  Commonly known as:  ADVIL,MOTRIN   600 mg, Oral, Every 6 Hours PRN      NIFEdipine CC 30 MG 24 hr tablet  Commonly known as:  ADALAT CC   30 mg, Oral, 2 times daily      oxyCODONE-acetaminophen 5-325 MG per tablet  Commonly known as:  PERCOCET   1 tablet, Oral, Every 4 Hours PRN         Continue These Medications      Instructions Start Date   Prenatal 27-1 27-1 MG tablet tablet   Oral, Daily      sertraline 100 MG tablet  Commonly known as:  ZOLOFT   100 mg, Oral, Daily         Stop These Medications    ferrous sulfate 324 (65 Fe) MG tablet delayed-release EC tablet     hydrOXYzine  50 MG tablet  Commonly known as:  ATARAX     promethazine 25 MG tablet  Commonly known as:  PHENERGAN            Follow-up Appointments    Additional Instructions for the Follow-ups that You Need to Schedule     Discharge Follow-up with Specified Provider: ольга; 1 Week   As directed      To:  ольга    Follow Up:  1 Week    Follow Up Details:  bp check               VARSHA Bolivar  08/07/20  09:18  Csd        Electronically signed by Sheila Chavez APRN at 08/07/20 0922

## 2020-08-07 NOTE — PLAN OF CARE
Ready for d/c  Problem: Patient Care Overview  Goal: Plan of Care Review  Outcome: Outcome(s) achieved  Flowsheets (Taken 2020 1153)  Plan of Care Reviewed With: patient  Goal: Individualization and Mutuality  Outcome: Outcome(s) achieved  Goal: Discharge Needs Assessment  Outcome: Outcome(s) achieved  Goal: Interprofessional Rounds/Family Conf  Outcome: Outcome(s) achieved     Problem: Breastfeeding (Adult,Obstetrics,Pediatric)  Goal: Signs and Symptoms of Listed Potential Problems Will be Absent, Minimized or Managed (Breastfeeding)  Outcome: Outcome(s) achieved     Problem:  Delivery (Adult,Obstetrics,Pediatric)  Goal: Signs and Symptoms of Listed Potential Problems Will be Absent, Minimized or Managed ( Delivery)  Outcome: Outcome(s) achieved

## 2020-08-07 NOTE — DISCHARGE SUMMARY
Discharge Summary    Date of Admission: 2020  Date of Discharge:  2020      Patient: Comfort Fernandez      MR#:[id number]    Primary Surgeon/OB: Salas Lucero MD      Discharge Diagnosis:  section at 36w2d    Procedures:  , Low Transverse     2020    10:49 AM        Hospital Course  Patient is a 39 y.o. female  at 36w2d status post  section.  Her BP was mildly elevated a few times one day 1-3.  It worsened to moderate BPs on day 4.  She was started on Procardia 30 XL bid and BP is wnl today.  She is asymptomatic.  Patient was advanced to regular diet on postoperative day#1.  On discharge, ambulating, tolerating a regular diet without any difficulties and her incision is dry, clean and intact.  Baby circ done.      Infant:   male  fetus 2682 g (5 lb 14.6 oz)  with Apgar scores of 8  , 9   at five minutes.    Condition on Discharge:  Stable    Vital Signs  Temp:  [98.3 °F (36.8 °C)-99.2 °F (37.3 °C)] 98.6 °F (37 °C)  Heart Rate:  [52-79] 79  Resp:  [16-18] 18  BP: (127-145)/(60-82) 136/69    Lab Results   Component Value Date    WBC 6.78 2020    HGB 9.4 (L) 2020    HCT 29.7 (L) 2020    MCV 92.5 2020     2020       Discharge Disposition  Home or Self Care    Discharge Medications     Discharge Medications      New Medications      Instructions Start Date   ibuprofen 600 MG tablet  Commonly known as:  ADVIL,MOTRIN   600 mg, Oral, Every 6 Hours PRN      NIFEdipine CC 30 MG 24 hr tablet  Commonly known as:  ADALAT CC   30 mg, Oral, 2 times daily      oxyCODONE-acetaminophen 5-325 MG per tablet  Commonly known as:  PERCOCET   1 tablet, Oral, Every 4 Hours PRN         Continue These Medications      Instructions Start Date   Prenatal 27-1 27-1 MG tablet tablet   Oral, Daily      sertraline 100 MG tablet  Commonly known as:  ZOLOFT   100 mg, Oral, Daily         Stop These Medications    ferrous sulfate 324 (65 Fe) MG tablet delayed-release  EC tablet     hydrOXYzine 50 MG tablet  Commonly known as:  ATARAX     promethazine 25 MG tablet  Commonly known as:  PHENERGAN            Follow-up Appointments    Additional Instructions for the Follow-ups that You Need to Schedule     Discharge Follow-up with Specified Provider: ольга; 1 Week   As directed      To:  ольга    Follow Up:  1 Week    Follow Up Details:  bp check               Sheila Chavez, VARSHA  08/07/20  09:18  Csd

## 2020-08-25 ENCOUNTER — APPOINTMENT (OUTPATIENT)
Dept: PREADMISSION TESTING | Facility: HOSPITAL | Age: 40
End: 2020-08-25

## 2020-10-21 RX ORDER — SERTRALINE HYDROCHLORIDE 100 MG/1
TABLET, FILM COATED ORAL
Qty: 30 TABLET | Refills: 0 | OUTPATIENT
Start: 2020-10-21

## 2021-04-03 ENCOUNTER — HOSPITAL ENCOUNTER (EMERGENCY)
Facility: HOSPITAL | Age: 41
Discharge: HOME | End: 2021-04-03
Attending: EMERGENCY MEDICINE | Admitting: EMERGENCY MEDICINE

## 2021-04-03 ENCOUNTER — APPOINTMENT (OUTPATIENT)
Dept: MRI IMAGING | Facility: HOSPITAL | Age: 41
End: 2021-04-03

## 2021-04-03 VITALS
OXYGEN SATURATION: 100 % | BODY MASS INDEX: 26.58 KG/M2 | HEIGHT: 63 IN | RESPIRATION RATE: 18 BRPM | WEIGHT: 150 LBS | DIASTOLIC BLOOD PRESSURE: 105 MMHG | HEART RATE: 91 BPM | TEMPERATURE: 98.4 F | SYSTOLIC BLOOD PRESSURE: 142 MMHG

## 2021-04-03 DIAGNOSIS — S13.9XXD NECK SPRAIN, SUBSEQUENT ENCOUNTER: ICD-10-CM

## 2021-04-03 DIAGNOSIS — M50.30 BULGE OF CERVICAL DISC WITHOUT MYELOPATHY: ICD-10-CM

## 2021-04-03 DIAGNOSIS — M62.838 TRAPEZIUS MUSCLE SPASM: ICD-10-CM
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
## 2021-04-03 DIAGNOSIS — M54.10 RADICULITIS: ICD-10-CM

## 2021-04-03 DIAGNOSIS — M54.2 NECK PAIN: Primary | ICD-10-CM

## 2021-04-03 PROCEDURE — 99284 EMERGENCY DEPT VISIT MOD MDM: CPT

## 2021-04-03 PROCEDURE — 96372 THER/PROPH/DIAG INJ SC/IM: CPT

## 2021-04-03 PROCEDURE — 25010000002 KETOROLAC TROMETHAMINE PER 15 MG: Performed by: EMERGENCY MEDICINE

## 2021-04-03 PROCEDURE — 72141 MRI NECK SPINE W/O DYE: CPT

## 2021-04-03 RX ORDER — HYDROCODONE BITARTRATE AND ACETAMINOPHEN 5; 325 MG/1; MG/1
1 TABLET ORAL EVERY 4 HOURS PRN
Qty: 15 TABLET | Refills: 0 | Status: SHIPPED | OUTPATIENT
Start: 2021-04-03 | End: 2021-04-07

## 2021-04-03 RX ORDER — HYDROCODONE BITARTRATE AND ACETAMINOPHEN 10; 325 MG/1; MG/1
1 TABLET ORAL ONCE
Status: COMPLETED | OUTPATIENT
Start: 2021-04-03 | End: 2021-04-03

## 2021-04-03 RX ORDER — KETOROLAC TROMETHAMINE 30 MG/ML
60 INJECTION, SOLUTION INTRAMUSCULAR; INTRAVENOUS ONCE
Status: COMPLETED | OUTPATIENT
Start: 2021-04-03 | End: 2021-04-03

## 2021-04-03 RX ORDER — METHOCARBAMOL 750 MG/1
1500 TABLET, FILM COATED ORAL ONCE
Status: COMPLETED | OUTPATIENT
Start: 2021-04-03 | End: 2021-04-03

## 2021-04-03 RX ORDER — METHOCARBAMOL 500 MG/1
1000 TABLET, FILM COATED ORAL 4 TIMES DAILY PRN
Qty: 30 TABLET | Refills: 0 | Status: SHIPPED | OUTPATIENT
Start: 2021-04-03 | End: 2023-02-26

## 2021-04-03 RX ORDER — METHYLPREDNISOLONE 4 MG/1
TABLET ORAL
Qty: 21 TABLET | Refills: 0 | OUTPATIENT
Start: 2021-04-03 | End: 2022-08-19

## 2021-04-03 RX ADMIN — KETOROLAC TROMETHAMINE 60 MG: 30 INJECTION, SOLUTION INTRAMUSCULAR; INTRAVENOUS at 20:19

## 2021-04-03 RX ADMIN — HYDROCODONE BITARTRATE AND ACETAMINOPHEN 1 TABLET: 10; 325 TABLET ORAL at 20:19

## 2021-04-03 RX ADMIN — HYDROCODONE BITARTRATE AND ACETAMINOPHEN 1 TABLET: 10; 325 TABLET ORAL at 23:32

## 2021-04-03 RX ADMIN — METHOCARBAMOL 1500 MG: 750 TABLET ORAL at 20:19
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
## 2021-04-04 NOTE — DISCHARGE INSTRUCTIONS
Rest with no vigorous physical activity.  Attempt to avoid holding your child with your left arm as this will exacerbate your symptoms    Continue your Motrin and Robaxin    Take the Norco as needed for pain and a limited amount as discussed with caution.  No driving or dangerous activity.    Follow-up Dr. Garcia in the office.  He will work an appointment in for you this week.  Call Jessica's nurse at extension [phone] here at [facility] at 9:00 on Monday to arrange her appointment.  He will already have this authorized.  When you call inform them that the ER physician spoke with Dr. Paz to arrange this tonight    Continue your physical therapy.    Follow-up with one of the [facility] physicians from the list below.  Call Monday as well to follow you in coordination with neurosurgery    Return the ED at once if you have any acute urgent emergent significant or progressive symptoms as discussed    CONTROLLED SUBSTANCE(S) EDUCATION  Controlled Substances have been prescribed by your provider to treat your medical condition and associated symptoms. Although Controlled Substances can be effective in relieving your pain or other symptoms, they may also cause serious adverse effects. It is important that you understand how to safely and appropriately take these medications.  Proper Use  1. Carefully following instructions for use, including timing of doses, whether to take the  medication with or without food, and any foods or other medications to avoid while taking the medication;  2. If you have low or impaired vision you should wear glasses when taking the medication and not take the medication in the dark;  3. You should read the prescription container label each time to confirm the dosage;  4. You should never use the medication after the expiration date;  5. You must never share the medication with others;  6. You must not take the medication with alcohol or other sedatives;  7. You should not take the medication to  help you sleep;  8. You should never break, crush or chew the medication;  9. If you have been prescribed a skin patch (transdermal), external heat, fever and exertion can increase the absorption of these products, leading to potentially fatal overdose;  10. You should immediately contact the physician’s office to report any adverse reaction and,  11. It is illegal to share, sell or give away Controlled Substances.  Driving and Work Safety  1. Controlled Substances may cause sleepiness, clouded thinking, decreased concentration, slower reflexes, or incoordination, all of which may create a danger to you and others when driving or operating certain type of machinery;  2. Avoid, if possible, driving or engaging in other potentially dangerous work or other activities, for a specific period of time until the initial effects of the Controlled Substances no longer create such dangers; and,  3. Ingesting other substances, such as alcohol, benzodiazepines or some cold remedies, at the same time you are taking the Controlled Substances prescribed or dispensed may increase cognitive and motor impairment.  Pregnancy  If you are pregnant or nursing a baby, avoid using Controlled Substances, or use them on a minimal basis in strict accordance with your provider’s instructions.  Potential for Overdose and Response  1. The use of Controlled Substances creates a risk of respiratory depression, which may result in serious harm or death. You and others should be watchful for the following warning signs of overmedication:  ? intoxicated behavior, such as confusion, slurred speech, or stumbling;  ? feeling dizzy or faint;  ? acting very drowsy or groggy;  ? unusual snoring, gasping, or snorting during sleep;  ? and/or difficulty waking up from sleep or difficulty in staying awake.  2. Immediately call “911” or an emergency service upon you or your caregivers observing or experiencing any of the following conditions:  ? you cannot be  aroused or waken, or are unable to talk after being awakened;  ? you have shortness of breath, slow or light breathing, or stopped breathing;  ? gurgling noises coming from your mouth or throat;  ? your body is limp, seems lifeless;  ? your face is pale or clammy;  ? your fingernails or lips are turning purple or blue; and/or  ? your heartbeat is slow, unusual or stopped  Safe Storage of Controlled Substances  1. If your Controlled Substances are not stored in a safe manner there is a potential that  partners, family members or others may improperly obtain your Controlled Substances;  2. Always keep the Controlled Substances in the original container;  3. Store Controlled Substances in a locked cabinet or other secure storage unit, that is cool, dry and out of direct sunlight, such as:  ? an existing safe;  ? a cut-proof travel bag;  ? a portable lock box designed for travel; or,  ? a locking medical box.  4. Do not store Controlled Substances in:  ? an unlocked medicine cabinet;  ? in your car; or,  ? in a refrigerator or freezer unless specifically recommended by the prescriber or  pharmacist; and  5. Immediately notify your provider if any Controlled Substances prescribed or dispensed by the provider are stolen or improperly taken by another individual.  Proper Disposal  1. It is important to safely and appropriately dispose of unused Controlled Substances that had been prescribed or dispensed by your provider;  2. Promptly dispose of unused Controlled Substances after the expiration date of the  prescription or after you no longer require the Controlled Substances to treat your medical condition;  3. In order to safely dispose of Controlled Substances, you should turn in the unused Controlled Substances as part of an approved governmental drug take-back program. The [place] Office of Drug Control Policy has a listing of [place] Permanent Drug Disposal Locations at http://www.odcp.[place].gov - click on the [place]  Prescriptions Drug Drop Map and Location on the left side of the page.  4. You should not flush Controlled Substances down the toilet; and,  5. You should personally remove any identifying information, including the prescription number, from an empty Controlled Substance container and then properly dispose of the empty container.  CONSENT FOR TREATMENT WITH CONTROLLED SUBSTANCE(S)  (This is for an initial prescription)  1. Controlled Substances  Controlled Substances are prescribed to treat a variety of conditions, including the relief  of chronic pain, to provide stimulation, promote weight loss, and treat mood disorders.  Pain relief is an important medical reason to take Controlled Substances.  Controlled Substances are drugs or chemical substances whose possession and use are  regulated under the Controlled Substances Act. The law requires that patient are informed of the risks, benefits, and alternatives of taking Controlled Substances.  2. Adverse Effects  As with any medication, there are risks and adverse effects associated with the use of  Controlled Substances. Common adverse effects of pain medicines could include, but are not limited to: sedation or sleepiness, nausea, vomiting, constipation, pruritus (itching), confusion, respiratory depression, and urinary retention. Some of these effects may make it unsafe for you to drive a vehicle, operate heavy machinery, or perform other tasks that require concentration and coordination. Excessive use of these Controlled Substances can lead to profound sedation, respiratory depression, coma, and/or death. Regarding stimulants, adverse effects could include, but are not limited to: drug dependency, neuropsychiatric symptoms such as psychosis and karlee, weight loss, cardiovascular events such as heart attack and stroke, insomnia, hypertension, and agitation. Any questions you have regarding the Controlled Substance(s) should be discussed with the prescribing  provider.  3. Physical Dependence, Tolerance, and Addiction  Although uncommon when used for their clinical indications, both pain relievers and  stimulants can cause physical dependence, tolerance, and/or addiction when used for a  prolonged period. Maintenance therapy with these Controlled Substances can cause  physical dependence. This means that if these medications are abruptly stopped, or  decreased significantly over a short period of time, a patient may experience withdrawal  symptoms such as: nervousness, irritability, insomnia, sweating, abdominal cramping,  nausea, vomiting, and diarrhea. Tolerance occurs when the effects of these Controlled  Substances are decreased over a period of prolonged use making it necessary to increase the dosage. Physical dependence and tolerance are different than addiction. Addiction is a complex disease characterized by compulsive craving or seeking and use of a substance despite its extreme negatives on a person. The risk of addiction may be increased in a patient with a history of alcoholism or other addiction.  4. Alternatives  Controlled Substances are routinely prescribed to treat moderate to severe pain or other  medical conditions. Other medicines are available to treat these conditions that are not  associated with tolerance or addiction, however, are associated with a lower level of pain  relief or stimulation. It may also be an alternative to not take any medicine to treat these  conditions, or to use alternative modalities, other than medicine to treat these conditions.  I voluntarily consent to the receipt of the above-named Controlled Substance(s) as prescribed by my provider. I have been informed of the benefits, risks, and alternatives to taking these medications. I acknowledge that I have read and understood all of the information above and I have had the opportunity to ask questions and have them answered to my satisfaction.    Follow up with one of the  [facility] Primary Care Providers below to setup primary care. If you need assistance coordinating a primary care appointment with a [facility] Primary Care Provider, please contact the Primary Care Coordinators at [phone] for appointment scheduling.    [facility], Primary Care   [address], [address]   [place], [place] [address] [phone]    [facility] Internal Medicine & Endocrinology  [address], [address]  [place] [address] [phone]    [facility] Family Medicine  [address], [address]   [place], [place] [address] [phone]    [facility] Primary Care  [address], [address]  [place], [place] [address] [phone]    [facility], Primary Care,   [address], [address]   [place], [place] [address] [phone]    [facility] Primary Care  [address]  [place], [place] [address] [phone]    [facility], Primary Care  [address], [address]  [place], [place] [address] [phone]    [facility] Internal Medicine & Pediatrics  [address], [address]   [place] [address] [phone]    [facility], Primary Care  [address], [place] [address] [phone]      [facility] Primary Care  [address], [address]   [place] [address] [phone]    [facility] Family Medicine  [address] [place] [address] [place] [address] [phone]

## 2021-04-07 ENCOUNTER — OFFICE VISIT (OUTPATIENT)
Dept: NEUROSURGERY | Facility: CLINIC | Age: 41
End: 2021-04-07

## 2021-04-07 VITALS — TEMPERATURE: 96.9 F | WEIGHT: 156.2 LBS | HEIGHT: 63 IN | BODY MASS INDEX: 27.68 KG/M2

## 2021-04-07 DIAGNOSIS — M50.30 DDD (DEGENERATIVE DISC DISEASE), CERVICAL: ICD-10-CM

## 2021-04-07 DIAGNOSIS — M47.812 CERVICAL SPONDYLOSIS: ICD-10-CM

## 2021-04-07 DIAGNOSIS — M54.2 NECK PAIN: Primary | ICD-10-CM

## 2021-04-07 DIAGNOSIS — R20.0 NUMBNESS AND TINGLING IN LEFT ARM: ICD-10-CM

## 2021-04-07 DIAGNOSIS — R20.2 NUMBNESS AND TINGLING IN LEFT ARM: ICD-10-CM

## 2021-04-07 PROCEDURE — 99203 OFFICE O/P NEW LOW 30 MIN: CPT | Performed by: NEUROLOGICAL SURGERY

## 2021-04-07 NOTE — PROGRESS NOTES
Patient: Comfort Fernandez  : 1980    Primary Care Provider: Provider, No Known    Requesting Provider: As above        History    Chief Complaint:   1.  Neck pain.  2.  Left wrist and hand pain with sensory alteration.    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  This is fairly constant but the intensity does wax and wane.  For a couple of weeks she has had pain as well as dysesthesia and sensory alteration involving the median nerve innervated aspects of her hand and fingers on the left.  She has no right-sided symptoms.  The hand symptoms bother her more in the morning.  She has diminished  on the left as well.  Of note she is right-handed.  She has had 2 physical therapy sessions.  She was recently seen in the emergency room.    Review of Systems   Constitutional: Negative for activity change, appetite change, chills, diaphoresis, fatigue, fever and unexpected weight change.   HENT: Negative for congestion, dental problem, drooling, ear discharge, ear pain, facial swelling, hearing loss, mouth sores, nosebleeds, postnasal drip, rhinorrhea, sinus pressure, sneezing, sore throat, tinnitus, trouble swallowing and voice change.    Eyes: Negative for photophobia, pain, discharge, redness, itching and visual disturbance.   Respiratory: Negative for apnea, cough, choking, chest tightness, shortness of breath, wheezing and stridor.    Cardiovascular: Negative for chest pain, palpitations and leg swelling.   Gastrointestinal: Negative for abdominal distention, abdominal pain, anal bleeding, blood in stool, constipation, diarrhea, nausea, rectal pain and vomiting.   Endocrine: Negative for cold intolerance, heat intolerance, polydipsia, polyphagia and polyuria.   Genitourinary: Negative for decreased urine volume, difficulty urinating, dysuria, enuresis, flank pain, frequency, genital sores, hematuria and urgency.   Musculoskeletal:  "Positive for neck pain. Negative for arthralgias, back pain, gait problem, joint swelling, myalgias and neck stiffness.   Skin: Negative for color change, pallor, rash and wound.   Allergic/Immunologic: Negative for environmental allergies, food allergies and immunocompromised state.   Neurological: Positive for weakness and numbness. Negative for dizziness, tremors, seizures, syncope, facial asymmetry, speech difficulty, light-headedness and headaches.   Hematological: Negative for adenopathy. Does not bruise/bleed easily.   Psychiatric/Behavioral: Positive for sleep disturbance. Negative for agitation, behavioral problems, confusion, decreased concentration, dysphoric mood, hallucinations, self-injury and suicidal ideas. The patient is not nervous/anxious and is not hyperactive.    All other systems reviewed and are negative.      The patient's past medical history, past surgical history, family history, and social history have been reviewed at length in the electronic medical record.    Physical Exam:   Temp 96.9 °F (36.1 °C) (Infrared)   Ht 160 cm (63\")   Wt 70.9 kg (156 lb 3.2 oz)   BMI 27.67 kg/m²   CONSTITUTIONAL: Patient is well-nourished, pleasant and appears stated age.  CV: Heart regular rate and rhythm without murmur, rub, or gallop.  PULMONARY: Lungs are clear to ascultation.  MUSCULOSKELETAL:  Neck tenderness to palpation is not observed.   ROM in her neck is mildly limited in extension.  Tinel's sign is positive at the left wrist.  NEUROLOGICAL:  Orientation, memory, attention span, language function, and cognition have been examined and are intact.  Strength is intact in the upper and lower extremities to direct testing.  Muscle tone is normal throughout.  Station and gait are normal.  Sensation is intact to light touch testing throughout.  Deep tendon reflexes are 2+ and symmetrical.  Denisha's Sign is negative bilaterally. No clonus is elicited.  Coordination is intact.      Medical Decision " Making    Data Review:   MRI of the cervical spine demonstrates diffuse degenerative disc disease with reversal of the normal lordosis.  The study is dated 4/3/2021.  There is some mild left paracentral disc bulging with a small component that descends below the disc space.  There is no cord or clear nerve root compromise.    Diagnosis:   1.  Mechanical neck pain.  2.  Cervical spondylosis and degenerative disc disease.  3.  Left carpal tunnel syndrome.    Treatment Options:   I have prescribed a wrist splint to be worn on the left at night.  She is going to continue her physical therapy for her neck.  She will follow-up in our clinic in several weeks to check on her progress.  Prior to that visit I am going to check electrodiagnostic studies of her left upper extremity.       Diagnosis Plan   1. Neck pain  Ambulatory Referral to Physical Therapy Evaluate and treat; Stretching, ROM, Strengthening   2. Cervical spondylosis     3. Numbness and tingling in left arm  EMG & Nerve Conduction Test    Miscellaneous DME   4. DDD (degenerative disc disease), cervical         Scribed for Sunil Paz MD by Alexandria Tejeda LUCIA 4/7/2021 11:47 EDT      I, Dr. Paz, personally performed the services described in the documentation, as scribed in my presence, and it is both accurate and complete.

## 2025-07-30 ENCOUNTER — TELEPHONE (OUTPATIENT)
Dept: OBSTETRICS AND GYNECOLOGY | Facility: CLINIC | Age: 45
End: 2025-07-30
Payer: COMMERCIAL

## 2025-07-30 NOTE — TELEPHONE ENCOUNTER
"Previous patient of Dr. Lucero; last seen in 2020.  Returned patient's call.   States she had GYN care @  in 2021 but hasn't seen any GYN for a while.   States she has a stage 4 uterine prolapse; \"it is completely outside my body\".   Reports she is currently inpatient at  with elevated platelet count and low H&H. Hx of leukemia. States she is to be discharged today and plan outpatient bone marrow biopsy.   Reports Hospital medicine @  consulted with GYN there; recommended outpatient GYN follow up.   States she is not able to empty bladder completely and does have some difficulty having BM.   She is requesting to reestablish care with Dr. Lucero.   Offered appointment for tomorrow but patient has other appointments already scheduled. Appointment scheduled for 08/01/25.  "

## 2025-08-01 ENCOUNTER — OFFICE VISIT (OUTPATIENT)
Dept: OBSTETRICS AND GYNECOLOGY | Facility: CLINIC | Age: 45
End: 2025-08-01
Payer: COMMERCIAL

## 2025-08-01 VITALS
WEIGHT: 120 LBS | SYSTOLIC BLOOD PRESSURE: 142 MMHG | DIASTOLIC BLOOD PRESSURE: 86 MMHG | HEIGHT: 63 IN | BODY MASS INDEX: 21.26 KG/M2

## 2025-08-01 DIAGNOSIS — Z01.419 PAP TEST, AS PART OF ROUTINE GYNECOLOGICAL EXAMINATION: Primary | ICD-10-CM

## 2025-08-01 DIAGNOSIS — N81.4 UTERINE PROLAPSE: ICD-10-CM

## 2025-08-01 DIAGNOSIS — B37.9 YEAST DETECTED: ICD-10-CM

## 2025-08-01 RX ORDER — TRAZODONE HYDROCHLORIDE 50 MG/1
50 TABLET ORAL NIGHTLY
COMMUNITY
Start: 2025-07-31 | End: 2025-08-30

## 2025-08-01 RX ORDER — FLUCONAZOLE 150 MG/1
150 TABLET ORAL DAILY
Qty: 2 TABLET | Refills: 3 | Status: SHIPPED | OUTPATIENT
Start: 2025-08-01

## 2025-08-01 RX ORDER — BUSPIRONE HYDROCHLORIDE 5 MG/1
5 TABLET ORAL 2 TIMES DAILY
COMMUNITY
Start: 2025-07-31

## 2025-08-01 RX ORDER — ONDANSETRON 4 MG/1
4 TABLET, ORALLY DISINTEGRATING ORAL
COMMUNITY
Start: 2025-07-30

## 2025-08-01 RX ORDER — LEVOFLOXACIN 250 MG/1
250 TABLET, FILM COATED ORAL DAILY
COMMUNITY
Start: 2025-07-31 | End: 2025-08-07

## 2025-08-01 RX ORDER — BUPRENORPHINE AND NALOXONE 8; 2 MG/1; MG/1
16 FILM, SOLUBLE BUCCAL; SUBLINGUAL DAILY
COMMUNITY
Start: 2025-07-31 | End: 2025-08-07

## 2025-08-01 NOTE — PROGRESS NOTES
Gynecologic Annual Exam Note          GYN Annual Exam     Gynecologic Exam and Vaginal Prolapse        Subjective     HPI  Comfort Fernandez is a 44 y.o. female, , who presents for annual well woman exam as a previous patient not seen in the last three years. Since her last visit she has started on suboxone for opioid abuse-states history of off and on since age 20-she is currently sober x3 months.  Patient's last menstrual period was 2025.  Her periods occur every 30 days, lasting up to 14 days.  The flow is moderate. She denies dysmenorrhea. Marital Status: . She is sexually active. She has not had new partners.. STD testing recommendations have been explained to the patient and she declines STD testing.    The patient would like to discuss the following complaints today: uterine prolapse-states has worsened over the past several months and at times is the size of a grapefruit. She is having difficulty emptying her bladder and trouble with BM at times.     Patient admitted to  after abnormal platelets and H&H-patient with hx of leukemia in . Plan for follow up with hematology. She is currently taking levaquin for UTI and now with c/o yeast infection. States discharge is 'covering' the prolapse and is raw and painful.     Additional OB/GYN History   contraceptive methods: Tubal ligation  Desires to: do not start contraception  History of migraines: no    Last Pap : . Result: unknown . HPV: unknown .   Last Completed Pap Smear    This patient has no relevant Health Maintenance data.       History of abnormal Pap smear: yes - previous leep  Family history of uterine, colon, breast, or ovarian cancer: no  Performs monthly Self-Breast Exam: no  Last mammogram: never.   Last Completed Mammogram    This patient has no relevant Health Maintenance data.         Colonoscopy: many years ago during chemo treatment  Exercises Regularly: no  Feelings of Anxiety or Depression: yes - anxiety  and depression  Tobacco Usage?: Yes Comfort Fernandez  reports that she has been smoking cigarettes. She has never used smokeless tobacco. I have educated her on the risk of diseases from using tobacco products such as cancer, COPD, and heart disease.     I advised her to quit and she is willing to quit. We have discussed the following method/s for tobacco cessation:  Counseling.  Together we have set a quit date for 2 weeks from today.  She will follow up with me in 2 week or sooner to check on her progress.    I spent 3  minutes counseling the patient.            Current Outpatient Medications:     buprenorphine-naloxone (SUBOXONE) 8-2 MG film film, Place 16 mg under the tongue Daily., Disp: , Rfl:     busPIRone (BUSPAR) 5 MG tablet, Take 1 tablet by mouth 2 (Two) Times a Day., Disp: , Rfl:     hydrOXYzine (ATARAX) 50 MG tablet, , Disp: , Rfl:     levoFLOXacin (LEVAQUIN) 250 MG tablet, Take 1 tablet by mouth Daily., Disp: , Rfl:     ondansetron ODT (ZOFRAN-ODT) 4 MG disintegrating tablet, Take 1 tablet by mouth., Disp: , Rfl:     sertraline (ZOLOFT) 100 MG tablet, Take 1 tablet by mouth Daily., Disp: , Rfl:     traZODone (DESYREL) 50 MG tablet, Take 1 tablet by mouth Every Night., Disp: , Rfl:     fluconazole (DIFLUCAN) 150 MG tablet, Take 1 tablet by mouth Daily. Take one tablet now and repeat in 3 days, Disp: 2 tablet, Rfl: 3    olopatadine (PATADAY) 0.2 % solution ophthalmic solution, Administer 1 drop to both eyes Daily. (Patient not taking: Reported on 2025), Disp: 2.5 mL, Rfl: 0     Patient denies the need for medication refills today.    OB History          4    Para   3    Term   1       1    AB   1    Living   3         SAB        IAB        Ectopic        Molar        Multiple   0    Live Births   3          Obstetric Comments   Medically terminated GI               Past Medical History:   Diagnosis Date    Abnormal Pap smear of cervix     Anxiety     Leukemia, acute lymphoid, in  "remission     diagnosed in , during 1st pregnancy    Neuropathy due to chemotherapeutic drug     Opioid abuse     currently clean x3 months. off and on use since age 20    PE (pulmonary thromboembolism)     while undergoing chemotherapy    Vaginal prolapse         Past Surgical History:   Procedure Laterality Date    CERVICAL BIOPSY  W/ LOOP ELECTRODE EXCISION       SECTION      x2     SECTION Bilateral 2020    Procedure:  SECTION REPEAT;  Surgeon: Salas Lucero MD;  Location: [facility] LABOR DELIVERY;  Service: Obstetrics/Gynecology;  Laterality: Bilateral;    TUBAL ABDOMINAL LIGATION         Health Maintenance   Topic Date Due    Annual Gynecologic Pelvic and Breast Exam  Never done    Pneumococcal Vaccine 0-49 (1 of 2 - PCV) Never done    TDAP/TD VACCINES (1 - Tdap) Never done    PAP SMEAR  Never done    ANNUAL PHYSICAL  Never done    MAMMOGRAM  Never done    COVID-19 Vaccine (3 - - season) 2024    INFLUENZA VACCINE  10/01/2025    HEPATITIS C SCREENING  Completed       The additional following portions of the patient's history were reviewed and updated as appropriate: allergies, current medications, past family history, past medical history, past social history, and past surgical history.    Review of Systems   Constitutional: Negative.    HENT: Negative.     Eyes: Negative.    Respiratory: Negative.     Cardiovascular: Negative.    Gastrointestinal: Negative.    Endocrine: Negative.    Genitourinary:  Positive for decreased urine volume, pelvic pain and pelvic pressure.   Musculoskeletal: Negative.    Skin: Negative.    Allergic/Immunologic: Negative.    Neurological: Negative.    Hematological: Negative.    Psychiatric/Behavioral: Negative.           I have reviewed and agree with the HPI, ROS, and historical information as entered above. Salas Lucero MD          Objective   /86   Ht 160 cm (63\")   Wt 54.4 kg (120 lb)   LMP 2025   Breastfeeding No  "  BMI 21.26 kg/m²     Physical Exam  Vitals and nursing note reviewed. Exam conducted with a chaperone present.   Constitutional:       Appearance: She is well-developed.   HENT:      Head: Normocephalic and atraumatic.   Neck:      Thyroid: No thyroid mass or thyromegaly.   Cardiovascular:      Rate and Rhythm: Normal rate and regular rhythm.      Heart sounds: No murmur heard.  Pulmonary:      Effort: Pulmonary effort is normal. No retractions.      Breath sounds: Normal breath sounds. No wheezing, rhonchi or rales.   Chest:      Chest wall: No mass or tenderness.   Breasts:     Right: Normal. No mass, nipple discharge, skin change or tenderness.      Left: Normal. No mass, nipple discharge, skin change or tenderness.   Abdominal:      General: Bowel sounds are normal.      Palpations: Abdomen is soft. Abdomen is not rigid. There is no mass.      Tenderness: There is no abdominal tenderness. There is no guarding.      Hernia: No hernia is present. There is no hernia in the left inguinal area.   Genitourinary:     Labia:         Right: No rash, tenderness or lesion.         Left: No rash, tenderness or lesion.       Vagina: Normal. No vaginal discharge or lesions.      Cervix: No cervical motion tenderness, discharge, lesion or cervical bleeding.      Uterus: Normal. Enlarged and with uterine prolapse. Not fixed and not tender.       Adnexa:         Right: No mass or tenderness.          Left: No mass or tenderness.        Rectum: No external hemorrhoid.      Comments: Patient has total uterine prolapse with cervix outside the vagina.  Is quite swollen.  Musculoskeletal:      Cervical back: Normal range of motion. No muscular tenderness.   Neurological:      Mental Status: She is alert and oriented to person, place, and time.   Psychiatric:         Behavior: Behavior normal.            Assessment and Plan    Problem List Items Addressed This Visit    None  Visit Diagnoses         Pap test, as part of routine  gynecological examination    -  Primary    Relevant Orders    LIQUID-BASED PAP SMEAR WITH HPV GENOTYPING IF ASCUS ([facility])    Mammo Screening Digital Tomosynthesis Bilateral With CAD      Uterine prolapse        Relevant Orders    Pessary    Ambulatory Referral to Gynecology (Completed)      Yeast detected        Relevant Medications    levoFLOXacin (LEVAQUIN) 250 MG tablet    fluconazole (DIFLUCAN) 150 MG tablet        Total uterine prolapse.  #7 ring prolapse placed and appears to work.  Patient seems like it work on continuing for 2 weeks.  If this does not work a hysterectomy will be needed at .  She is already a patient at  for other issues.    GYN annual well woman exam.   Pap guidelines reviewed.  Reviewed monthly self breast exams.  Instructed to call with lumps, pain, or breast discharge.    Ordered Mammogram today  Recommended use of Vitamin D replacement and getting adequate calcium in her diet. (1500mg)  Reviewed BMI and weight loss as preventative health measures.   Reviewed exercise as a preventative health measures.   Symptoms of menopausal transition reviewed with patient.   Discussed importance of routine colon cancer screening. Reviewed current guidelines. Recommended colonoscopy after age 45.  Return in about 2 weeks (around 8/15/2025).     Salas Lucero MD  08/01/2025

## 2025-08-05 LAB — REF LAB TEST METHOD: NORMAL

## 2025-08-19 LAB
NCCN CRITERIA FLAG: ABNORMAL
TYRER CUZICK SCORE: 10.8

## 2025-08-21 ENCOUNTER — RESULTS FOLLOW-UP (OUTPATIENT)
Facility: HOSPITAL | Age: 45
End: 2025-08-21
Payer: COMMERCIAL

## 2025-08-21 DIAGNOSIS — Z13.79 GENETIC TESTING: ICD-10-CM

## 2025-08-21 DIAGNOSIS — Z80.0 FAMILY HISTORY OF PANCREATIC CANCER: Primary | ICD-10-CM

## (undated) DEVICE — ADHS LIQ MASTISOL 2/3ML

## (undated) DEVICE — MAT PREVALON MOBL TRANSFR AIR WO/PAD 39X80IN

## (undated) DEVICE — SUT VIC 2/0 CT1 27IN J339H BX/36

## (undated) DEVICE — COATED VICRYL  (POLYGLACTIN 910) SUTURE, VIOLET BRAIDED, STERILE, SYNTHETIC ABSORBABLE SUTURE: Brand: COATED VICRYL

## (undated) DEVICE — SOL IRR NACL 0.9PCT BT 1000ML

## (undated) DEVICE — SUT PLAIN  3/0 CT1 27IN 842H

## (undated) DEVICE — GLV SURG BIOGEL LTX PF 7 1/2

## (undated) DEVICE — PK C/SECT 10

## (undated) DEVICE — SOL IRR H2O BTL 1000ML STRL

## (undated) DEVICE — SUT GUT CHRM 1 CTX 36IN 905H

## (undated) DEVICE — SUT VIC 3/0 PS2 27IN J427H

## (undated) DEVICE — 3M™ STERI-STRIP™ REINFORCED ADHESIVE SKIN CLOSURES, R1547, 1/2 IN X 4 IN (12 MM X 100 MM), 6 STRIPS/ENVELOPE: Brand: 3M™ STERI-STRIP™

## (undated) DEVICE — PROXIMATE RH ROTATING HEAD SKIN STAPLERS (35 WIDE) CONTAINS 35 STAINLESS STEEL STAPLES: Brand: PROXIMATE

## (undated) DEVICE — TRY SPINE BLCK WHITACRE 25G 3X5IN